# Patient Record
Sex: FEMALE | Race: WHITE | Employment: FULL TIME | URBAN - METROPOLITAN AREA
[De-identification: names, ages, dates, MRNs, and addresses within clinical notes are randomized per-mention and may not be internally consistent; named-entity substitution may affect disease eponyms.]

---

## 2021-12-23 ENCOUNTER — APPOINTMENT (OUTPATIENT)
Dept: GENERAL RADIOLOGY | Age: 26
End: 2021-12-23
Attending: PHYSICIAN ASSISTANT
Payer: COMMERCIAL

## 2021-12-23 ENCOUNTER — HOSPITAL ENCOUNTER (EMERGENCY)
Age: 26
Discharge: HOME | End: 2021-12-23
Attending: EMERGENCY MEDICINE
Payer: COMMERCIAL

## 2021-12-23 VITALS
RESPIRATION RATE: 16 BRPM | WEIGHT: 151.4 LBS | OXYGEN SATURATION: 99 % | BODY MASS INDEX: 25.22 KG/M2 | SYSTOLIC BLOOD PRESSURE: 110 MMHG | DIASTOLIC BLOOD PRESSURE: 71 MMHG | HEIGHT: 65 IN | HEART RATE: 106 BPM | TEMPERATURE: 98.6 F

## 2021-12-23 DIAGNOSIS — U07.1 COVID-19: Primary | ICD-10-CM

## 2021-12-23 DIAGNOSIS — N39.0 URINARY TRACT INFECTION WITHOUT HEMATURIA, SITE UNSPECIFIED: ICD-10-CM

## 2021-12-23 DIAGNOSIS — R51.9 NONINTRACTABLE HEADACHE, UNSPECIFIED CHRONICITY PATTERN, UNSPECIFIED HEADACHE TYPE: ICD-10-CM

## 2021-12-23 DIAGNOSIS — E86.0 DEHYDRATION: ICD-10-CM

## 2021-12-23 DIAGNOSIS — R07.9 CHEST PAIN, UNSPECIFIED TYPE: ICD-10-CM

## 2021-12-23 LAB
ALBUMIN SERPL-MCNC: 3.2 G/DL (ref 3.5–5)
ALBUMIN/GLOB SERPL: 0.9 {RATIO} (ref 1.2–3.5)
ALP SERPL-CCNC: 54 U/L (ref 50–130)
ALT SERPL-CCNC: 13 U/L (ref 12–65)
ANION GAP SERPL CALC-SCNC: 5 MMOL/L (ref 7–16)
AST SERPL-CCNC: 10 U/L (ref 15–37)
BACTERIA URNS QL MICRO: ABNORMAL /HPF
BASOPHILS # BLD: 0 K/UL (ref 0–0.2)
BASOPHILS NFR BLD: 0 % (ref 0–2)
BILIRUB SERPL-MCNC: 0.3 MG/DL (ref 0.2–1.1)
BUN SERPL-MCNC: 14 MG/DL (ref 6–23)
CALCIUM SERPL-MCNC: 9.2 MG/DL (ref 8.3–10.4)
CASTS URNS QL MICRO: 0 /LPF
CHLORIDE SERPL-SCNC: 106 MMOL/L (ref 98–107)
CO2 SERPL-SCNC: 27 MMOL/L (ref 21–32)
COVID-19 RAPID TEST, COVR: DETECTED
CREAT SERPL-MCNC: 0.91 MG/DL (ref 0.6–1)
CRYSTALS URNS QL MICRO: ABNORMAL /LPF
D DIMER PPP FEU-MCNC: <0.27 UG/ML(FEU)
DIFFERENTIAL METHOD BLD: ABNORMAL
EOSINOPHIL # BLD: 0 K/UL (ref 0–0.8)
EOSINOPHIL NFR BLD: 0 % (ref 0.5–7.8)
EPI CELLS #/AREA URNS HPF: ABNORMAL /HPF
ERYTHROCYTE [DISTWIDTH] IN BLOOD BY AUTOMATED COUNT: 12.8 % (ref 11.9–14.6)
GLOBULIN SER CALC-MCNC: 3.4 G/DL (ref 2.3–3.5)
GLUCOSE SERPL-MCNC: 104 MG/DL (ref 65–100)
HCG UR QL: NEGATIVE
HCT VFR BLD AUTO: 43.8 % (ref 35.8–46.3)
HGB BLD-MCNC: 14.5 G/DL (ref 11.7–15.4)
IMM GRANULOCYTES # BLD AUTO: 0.2 K/UL (ref 0–0.5)
IMM GRANULOCYTES NFR BLD AUTO: 2 % (ref 0–5)
LIPASE SERPL-CCNC: 125 U/L (ref 73–393)
LYMPHOCYTES # BLD: 0.8 K/UL (ref 0.5–4.6)
LYMPHOCYTES NFR BLD: 8 % (ref 13–44)
MCH RBC QN AUTO: 30.1 PG (ref 26.1–32.9)
MCHC RBC AUTO-ENTMCNC: 33.1 G/DL (ref 31.4–35)
MCV RBC AUTO: 91.1 FL (ref 79.6–97.8)
MONOCYTES # BLD: 0.5 K/UL (ref 0.1–1.3)
MONOCYTES NFR BLD: 5 % (ref 4–12)
MUCOUS THREADS URNS QL MICRO: 0 /LPF
NEUTS SEG # BLD: 9 K/UL (ref 1.7–8.2)
NEUTS SEG NFR BLD: 85 % (ref 43–78)
NRBC # BLD: 0 K/UL (ref 0–0.2)
PLATELET # BLD AUTO: 209 K/UL (ref 150–450)
PMV BLD AUTO: 9 FL (ref 9.4–12.3)
POTASSIUM SERPL-SCNC: 3.4 MMOL/L (ref 3.5–5.1)
PROT SERPL-MCNC: 6.6 G/DL (ref 6.3–8.2)
RBC # BLD AUTO: 4.81 M/UL (ref 4.05–5.2)
RBC #/AREA URNS HPF: 0 /HPF
SODIUM SERPL-SCNC: 138 MMOL/L (ref 136–145)
SOURCE, COVRS: ABNORMAL
TROPONIN-HIGH SENSITIVITY: 5.8 PG/ML (ref 0–14)
TROPONIN-HIGH SENSITIVITY: 7.1 PG/ML (ref 0–14)
WBC # BLD AUTO: 10.6 K/UL (ref 4.3–11.1)
WBC URNS QL MICRO: ABNORMAL /HPF

## 2021-12-23 PROCEDURE — 74011250637 HC RX REV CODE- 250/637: Performed by: PHYSICIAN ASSISTANT

## 2021-12-23 PROCEDURE — 71045 X-RAY EXAM CHEST 1 VIEW: CPT

## 2021-12-23 PROCEDURE — 99284 EMERGENCY DEPT VISIT MOD MDM: CPT

## 2021-12-23 PROCEDURE — 84484 ASSAY OF TROPONIN QUANT: CPT

## 2021-12-23 PROCEDURE — 81003 URINALYSIS AUTO W/O SCOPE: CPT

## 2021-12-23 PROCEDURE — 81015 MICROSCOPIC EXAM OF URINE: CPT

## 2021-12-23 PROCEDURE — 93005 ELECTROCARDIOGRAM TRACING: CPT | Performed by: PHYSICIAN ASSISTANT

## 2021-12-23 PROCEDURE — 87635 SARS-COV-2 COVID-19 AMP PRB: CPT

## 2021-12-23 PROCEDURE — 80053 COMPREHEN METABOLIC PANEL: CPT

## 2021-12-23 PROCEDURE — 96360 HYDRATION IV INFUSION INIT: CPT

## 2021-12-23 PROCEDURE — 85379 FIBRIN DEGRADATION QUANT: CPT

## 2021-12-23 PROCEDURE — 85025 COMPLETE CBC W/AUTO DIFF WBC: CPT

## 2021-12-23 PROCEDURE — 81025 URINE PREGNANCY TEST: CPT

## 2021-12-23 PROCEDURE — 74011000258 HC RX REV CODE- 258: Performed by: PHYSICIAN ASSISTANT

## 2021-12-23 PROCEDURE — 74011000636 HC RX REV CODE- 636: Performed by: PHYSICIAN ASSISTANT

## 2021-12-23 PROCEDURE — 74011250636 HC RX REV CODE- 250/636: Performed by: PHYSICIAN ASSISTANT

## 2021-12-23 PROCEDURE — 83690 ASSAY OF LIPASE: CPT

## 2021-12-23 PROCEDURE — 74011250636 HC RX REV CODE- 250/636: Performed by: NURSE PRACTITIONER

## 2021-12-23 PROCEDURE — M0243 CASIRIVI AND IMDEVI INFUSION: HCPCS

## 2021-12-23 RX ORDER — IBUPROFEN 800 MG/1
800 TABLET ORAL
Status: COMPLETED | OUTPATIENT
Start: 2021-12-23 | End: 2021-12-23

## 2021-12-23 RX ORDER — CEPHALEXIN 500 MG/1
500 CAPSULE ORAL 2 TIMES DAILY
Qty: 14 CAPSULE | Refills: 0 | Status: SHIPPED | OUTPATIENT
Start: 2021-12-23 | End: 2021-12-30

## 2021-12-23 RX ORDER — SODIUM CHLORIDE 9 MG/ML
1000 INJECTION, SOLUTION INTRAVENOUS ONCE
Status: COMPLETED | OUTPATIENT
Start: 2021-12-23 | End: 2021-12-23

## 2021-12-23 RX ADMIN — IMDEVIMAB: 1332 INJECTION, SOLUTION, CONCENTRATE INTRAVENOUS at 14:12

## 2021-12-23 RX ADMIN — IBUPROFEN 800 MG: 800 TABLET, FILM COATED ORAL at 15:21

## 2021-12-23 RX ADMIN — SODIUM CHLORIDE 1000 ML: 900 INJECTION, SOLUTION INTRAVENOUS at 15:21

## 2021-12-23 RX ADMIN — SODIUM CHLORIDE 1000 ML: 900 INJECTION, SOLUTION INTRAVENOUS at 16:41

## 2021-12-23 NOTE — Clinical Note
[address] [address] [place] EMERGENCY DEPT  [facility] [id number] [phone]    Work/School Note    Date: 12/23/2021     To Whom It May concern:    Theresa Guzman was evaluated by the following provider(s):  Attending Provider: Nakia Olivares DO  Physician Assistant: Kush Guerra virus is suspected. Per the CDC guidelines we recommend home isolation until the following conditions are all met:    1. At least 10 days have passed since symptoms first appeared and  2. At least 24 hours have passed since last fever without the use of fever-reducing medications and  3.  Symptoms (e.g., cough, shortness of breath) have improved      Sincerely,          AALIYAH Wilson

## 2021-12-23 NOTE — ED NOTES
I have reviewed discharge instructions with the patient. The patient verbalized understanding. Patient left ED via Discharge Method: ambulatory to Home with (insert name of self. Opportunity for questions and clarification provided. Patient given 1 scripts. To continue your aftercare when you leave the hospital, you may receive an automated call from our care team to check in on how you are doing. This is a free service and part of our promise to provide the best care and service to meet your aftercare needs.  If you have questions, or wish to unsubscribe from this service please call [phone]. Thank you for Choosing our [facility] Emergency Department.

## 2021-12-23 NOTE — ED PROVIDER NOTES
Patient signed out to me at 1600 shift change. Please see prior providers note for additional the ED course. Briefly, 68-year-old female in the ED with cough, congestion, fever and chills for the last few days. States she had some chest pain this morning, denies at this time. With positive COVID-19 test result. Denies history of CAD, PE or DVT, has a family history of heart disease. Per prior providers note, has history of migraines and reported a headache earlier, this is also resolved. Patient had a largely reassuring work-up with 2 - troponins, reassuring EKG, labs, urinalysis consistent with UTI. She was signed out to me pending return of urine microscope. Urine microscope returned with WBCs present, bacteria. She was febrile and tachycardic initially, mildly hypotensive. States that she typically has an elevated heart rate. Negative D-dimer. Patient received IV fluids, Regeneron, antipyretic in the ED. On my evaluation, patient is denying all complaint, she is seated upright, pleasant conversational, no chest pain or tightness, difficulty breathing, mopped assist, diaphoresis. Discussed all results and plan of care. Patient this is either discharged home following treatment. This is reasonable. She is given very strict return to ED for care instructions. Will provide additional IV fluids and prescription for Keflex. To follow-up with PCP in 1-2 days for reevaluation and to return to ED immediately for new, worsening, concerns. Denies urinary complaint. Low suspicion of deep space infection, RPA/PTA, strep pharyngitis, influenza, encephalitis, meningitis, bacteremia, pneumonia, CA, myocarditis, PE/DVT, ACS, COPD exacerbation, other emergent process. Symptoms likely related to viral URI, with concern given for COVID-19 infection. Advised on therapeutic measures, given very strict return to ED for care instructions, self-quarantine per CDC guidelines.   Symptoms likely related to viral URI, with concern given for COVID-19 infection.

## 2021-12-23 NOTE — ED NOTES
Patient here with concern for chest pain and pain in left arm. Positive covid test yesterday. Reports mild SOB. Pain is what brought her to the ER. Having some nausea as well. Mild distress, O2 sat WNL. Patient evaluated initially in triage. Rapid Medical Evaluation was conducted and necessary orders have been placed. I have performed a medical screening exam.  Care will now be transferred to the provider in the back of the emergency department.   Krystle Lowery, [place] 98:42 PM

## 2021-12-23 NOTE — DISCHARGE INSTRUCTIONS
Drink plenty of fluids, rest, follow-up with your primary care physician for recheck. Check your oxygen saturations multiple times a day and return if they are staying consistently below 90%.

## 2021-12-23 NOTE — ED PROVIDER NOTES
Patient is here with a positive Covid test from home. She states that she drove back from [place] on Monday after visiting family and started with a headache however she has a history of migraines. She has been congested for a couple of days and finally took a Covid test this morning because she was having fever and chills. She had a positive test. She started with chest pain at 10 AM and has no history of blood clots or coronary disease. Her mother had heart disease at 36 and her grandmother at 48. She is also had some left arm pain. There is no swelling to her arm. No swelling/tingling or weakness to her arms or legs, headache, dizziness, dyspnea on exertion, orthopnea, trouble with urination or bowel movements and her last menstrual cycle was 1 week ago and normal. She is here with her significant other and he drove her in. The history is provided by the patient. No past medical history on file. No past surgical history on file. No family history on file. Social History     Socioeconomic History    Marital status:      Spouse name: Not on file    Number of children: Not on file    Years of education: Not on file    Highest education level: Not on file   Occupational History    Not on file   Tobacco Use    Smoking status: Not on file    Smokeless tobacco: Not on file   Substance and Sexual Activity    Alcohol use: Not on file    Drug use: Not on file    Sexual activity: Not on file   Other Topics Concern    Not on file   Social History Narrative    Not on file     Social Determinants of Health     Financial Resource Strain:     Difficulty of Paying Living Expenses: Not on file   Food Insecurity:     Worried About Running Out of Food in the Last Year: Not on file    Ayanna of Food in the Last Year: Not on file   Transportation Needs:     Lack of Transportation (Medical): Not on file    Lack of Transportation (Non-Medical):  Not on file   Physical Activity:     Days of Exercise per Week: Not on file    Minutes of Exercise per Session: Not on file   Stress:     Feeling of Stress : Not on file   Social Connections:     Frequency of Communication with Friends and Family: Not on file    Frequency of Social Gatherings with Friends and Family: Not on file    Attends Yazidism Services: Not on file    Active Member of [address] [place] [address] [employer] or Organizations: Not on file    Attends Club or Organization Meetings: Not on file    Marital Status: Not on file   Intimate Partner Violence:     Fear of Current or Ex-Partner: Not on file    Emotionally Abused: Not on file    Physically Abused: Not on file    Sexually Abused: Not on file   Housing Stability:     Unable to Pay for Housing in the Last Year: Not on file    Number of [place] in the Last Year: Not on file    Unstable Housing in the Last Year: Not on file         ALLERGIES: Patient has no known allergies. Review of Systems   Constitutional: Positive for chills. HENT: Negative. Eyes: Negative. Respiratory: Positive for shortness of breath. Cardiovascular: Positive for chest pain. Negative for leg swelling. Gastrointestinal: Positive for nausea. Genitourinary: Negative. Musculoskeletal: Negative. Skin: Negative. Neurological: Negative. Psychiatric/Behavioral: Negative. All other systems reviewed and are negative. Vitals:    12/23/21 1159 12/23/21 1244   BP: 93/64    Pulse: 82    Resp: 16    Temp: 98.4 °F (36.9 °C)    SpO2: 99%    Weight: 68 kg (150 lb) 68.7 kg (151 lb 6.4 oz)   Height: 5' 6\" (1.676 m)             Physical Exam  Vitals and nursing note reviewed. Constitutional:       Appearance: She is well-developed. HENT:      Head: Normocephalic and atraumatic.       Right Ear: Tympanic membrane, ear canal and external ear normal.      Left Ear: Tympanic membrane, ear canal and external ear normal.      Nose: Nose normal.      Mouth/Throat:      Mouth: Mucous membranes are moist.   Eyes: Extraocular Movements: Extraocular movements intact. Conjunctiva/sclera: Conjunctivae normal.      Pupils: Pupils are equal, round, and reactive to light. Cardiovascular:      Rate and Rhythm: Normal rate and regular rhythm. Pulses: Normal pulses. Heart sounds: Normal heart sounds. Pulmonary:      Effort: Pulmonary effort is normal.      Breath sounds: Normal breath sounds. Comments: Tenderness palpation over the anterior chest wall, palpation of this area reproduces her pain. Chest:      Chest wall: Tenderness present. Abdominal:      General: Abdomen is flat. Bowel sounds are normal.      Palpations: Abdomen is soft. Musculoskeletal:         General: Normal range of motion. Cervical back: Normal range of motion and neck supple. Skin:     General: Skin is warm and dry. Capillary Refill: Capillary refill takes less than 2 seconds. Neurological:      General: No focal deficit present. Mental Status: She is alert and oriented to person, place, and time. Mental status is at baseline. Cranial Nerves: No cranial nerve deficit. Sensory: No sensory deficit. Motor: No weakness. Coordination: Coordination normal.      Gait: Gait normal.      Deep Tendon Reflexes: Reflexes are normal and symmetric. Reflexes normal.   Psychiatric:         Mood and Affect: Mood normal.         Behavior: Behavior normal.         Thought Content: Thought content normal.         Judgment: Judgment normal.          MDM  Number of Diagnoses or Management Options     Amount and/or Complexity of Data Reviewed  Clinical lab tests: ordered  Tests in the radiology section of CPT®: ordered    Risk of Complications, Morbidity, and/or Mortality  Presenting problems: moderate  Diagnostic procedures: moderate  Management options: moderate           Procedures      3:12 PM patient with a headache however she has a history of migraines.   She had taken her migraine medicine earlier without relief. I will give her some ibuprofen now and some IV fluids to see if that will help. Her  is driving her home. Second troponin was due at 2:30pm.  Awaiting that. Patient is tachycardic but is dehydrated. I had added a liter of normal saline to help with that. I believe the tachycardia is coming from that. Her D-dimer is less than 0.27. Patient was discussed with Dr. Ellne Hays, attending physician. We discussed the history, physical exam and work-up. Patient with a positive Covid test requesting antibody treatment here. Patient was given the Regeneron monoclonal antibody treatment and did well. Patient was monitored for an hour after the treatment and did fine. Patient was encouraged to check their oxygen saturations multiple times a day at home and return to the ED if it is staying consistently below 90%. Patient does not have a pulse oximeter at home that they can use, so I will send her with one. Drink plenty of fluids, rest, use over-the-counter ibuprofen and or Tylenol instructed and return to the ED if worsening in any way. Patient is stable for discharge and ambulatory out of the ED without difficulty at this time. 4:01 PM Care transferred to [facility] NP at shift change. He will review urine microxcopic and 2 hr troponin.

## 2021-12-23 NOTE — ED TRIAGE NOTES
Pt states she has had a cough since yesterday and a positive home covid test this morning. States some nausea since this morning and SOB with chest pain into left arm. Masked for triage.

## 2021-12-24 LAB
ATRIAL RATE: 85 BPM
CALCULATED P AXIS, ECG09: 75 DEGREES
CALCULATED R AXIS, ECG10: 82 DEGREES
CALCULATED T AXIS, ECG11: 76 DEGREES
DIAGNOSIS, 93000: NORMAL
P-R INTERVAL, ECG05: 148 MS
Q-T INTERVAL, ECG07: 318 MS
QRS DURATION, ECG06: 72 MS
QTC CALCULATION (BEZET), ECG08: 378 MS
VENTRICULAR RATE, ECG03: 85 BPM

## 2022-04-01 ENCOUNTER — OFFICE VISIT (OUTPATIENT)
Dept: FAMILY MEDICINE CLINIC | Age: 27
End: 2022-04-01
Payer: COMMERCIAL

## 2022-04-01 VITALS
RESPIRATION RATE: 17 BRPM | HEART RATE: 79 BPM | HEIGHT: 65 IN | SYSTOLIC BLOOD PRESSURE: 103 MMHG | BODY MASS INDEX: 25.33 KG/M2 | DIASTOLIC BLOOD PRESSURE: 66 MMHG | TEMPERATURE: 97.1 F | OXYGEN SATURATION: 99 % | WEIGHT: 152 LBS

## 2022-04-01 DIAGNOSIS — G43.019 INTRACTABLE MIGRAINE WITHOUT AURA AND WITHOUT STATUS MIGRAINOSUS: Primary | ICD-10-CM

## 2022-04-01 DIAGNOSIS — Z00.00 ENCOUNTER FOR MEDICAL EXAMINATION TO ESTABLISH CARE: ICD-10-CM

## 2022-04-01 DIAGNOSIS — Z87.42 HISTORY OF HEAVY PERIODS: ICD-10-CM

## 2022-04-01 DIAGNOSIS — G93.9 BRAIN LESION: ICD-10-CM

## 2022-04-01 DIAGNOSIS — N80.9 ENDOMETRIOSIS: ICD-10-CM

## 2022-04-01 PROCEDURE — 99204 OFFICE O/P NEW MOD 45 MIN: CPT | Performed by: FAMILY MEDICINE

## 2022-04-01 RX ORDER — ACETAMINOPHEN AND CODEINE PHOSPHATE 120; 12 MG/5ML; MG/5ML
1 SOLUTION ORAL DAILY
Qty: 3 DOSE PACK | Refills: 1 | Status: SHIPPED | OUTPATIENT
Start: 2022-04-01 | End: 2022-10-19

## 2022-04-01 RX ORDER — DULOXETIN HYDROCHLORIDE 30 MG/1
30 CAPSULE, DELAYED RELEASE ORAL DAILY
COMMUNITY
End: 2022-05-12 | Stop reason: ALTCHOICE

## 2022-04-01 RX ORDER — NORGESTIMATE AND ETHINYL ESTRADIOL 0.25-0.035
1 KIT ORAL DAILY
COMMUNITY
End: 2022-04-01

## 2022-04-01 RX ORDER — PROPRANOLOL HYDROCHLORIDE 20 MG/1
40 TABLET ORAL DAILY
Qty: 30 TABLET | Refills: 1 | Status: SHIPPED | OUTPATIENT
Start: 2022-04-01 | End: 2022-04-13 | Stop reason: SDUPTHER

## 2022-04-01 RX ORDER — RIZATRIPTAN BENZOATE 10 MG/1
10 TABLET ORAL
COMMUNITY
End: 2022-08-16

## 2022-04-01 NOTE — PROGRESS NOTES
Chief Complaint   Patient presents with   [address]    Neck Pain    Referral Request     Neurology     1. Have you been to the ER, urgent care clinic since your last visit? Hospitalized since your last visit? N/A    2. Have you seen or consulted any other health care providers outside of the [address] [place] [address] since your last visit? Include any pap smears or colon screening.  N/A

## 2022-04-01 NOTE — PROGRESS NOTES
Mc Nickerson  27 y.o. female  1995  [id number]  [facility]  Progress Note     Assessment and Plan:    1. Intractable migraine without aura and without status migrainosus  Will try BB as PPX, given other agents failed. Referral to neuro provided. No change to other meds at this time. Records requested. - REFERRAL TO NEUROLOGY  - propranoloL (INDERAL) 20 mg tablet; Take 2 Tablets by mouth daily. Dispense: 30 Tablet; Refill: 1    2. Brain lesion  Per patient had a lesion ? cyst on MRI in [place]. Records requested. Per patient she was followed by neuro for this and wanted to establish care with neuro here as well. - REFERRAL TO NEUROLOGY    3. History of heavy periods  Can not be on COCP. This was cancelled and progestin only option provided. Patient desires IUD and paperwork was provided. - norethindrone (MICRONOR) 0.35 mg tab; Take 1 Tablet by mouth daily. Dispense: 3 Dose Pack; Refill: 1    4. Endometriosis  Never has laparoscopic surgery, but presumably was treated for this by OBGYN. Getting records. 5. Establish care  Diet, exercise and safety discussed with patient. Diagnoses and plans were discussed with the patient. After visit summary given to the patient as well. Agustín Pacheco MD    Mc Nickerson is a 27 y.o. female who had concerns including Establish Care, Neck Pain, and Referral Request (Neurology). Health maintenance:    Used to see IM doctor Thalia Kline in [place], [place].    Her only chronic issue was neck pain, that made her migraines worse   Seen PT 2 times, which helped as well as TP injections  She used to follow with neurology Dr Macario Pablo, who also treated with injections what sounds like occipital nerve block - helped just for 3 months, but much better than regular TP  She needs referral to neurology     Migraines:   Rizotriptan -given rebound HA sometimes  Elmore Heal - helps little less, but does not give rebound HA  Once a month she has HA that \"knocks me down\" and she is \"out for 24 hours\", then it stops  Tried topiramite, amytriptiline and it did not help   Cymbalta helps with anxiety and a little with stress HA, but does little to none to prevent migraines   Wants to try another PPX   She is on OCP     Birth control:   Used to be on Depo  Was told she may need to change OCP to something else d/t migraines, but did not  Has hx of very heavy menses and endometriosis     The following sections were reviewed & updated as appropriate: PMH, PSH, FH, and SH. There is no problem list on file for this patient. Prior to Admission medications    Medication Sig Start Date End Date Taking? Authorizing Provider   DULoxetine (Cymbalta) 30 mg capsule Take 30 mg by mouth daily. Yes Provider, Historical   rizatriptan (MAXALT) 10 mg tablet Take 10 mg by mouth once as needed for Migraine. May repeat in 2 hours if needed   Yes Provider, Historical   ubrogepant Madonna Feeler) 100 mg tablet Take 100 mg by mouth once as needed for Migraine. Yes Provider, Historical   propranoloL (INDERAL) 20 mg tablet Take 2 Tablets by mouth daily. 4/1/22  Yes Kendell Nielsen MD   norethindrone [facility]) 0.35 mg tab Take 1 Tablet by mouth daily.  4/1/22  Yes Kendell Nielsen MD          No Known Allergies      Smoker:   Social History     Tobacco Use   Smoking Status Never Smoker   Smokeless Tobacco Never Used     ETOH:   Social History     Substance and Sexual Activity   Alcohol Use Yes       FH:    Family History   Problem Relation Age of Onset    Asthma Mother     Supraventricular tachycardia Mother     Thyroid Disease Father     Diabetes Father     Migraines Father     Migraines Brother     Asthma Brother     Depression Brother     ADD / ADHD Brother     Heart Attack Maternal Grandmother     Other Maternal Grandmother         Diverticulitis    Asthma Maternal Grandmother     Cancer Maternal Grandfather         lung (smoker)    Alcohol abuse Maternal Grandfather     Liver Disease Maternal Grandfather     Stroke Paternal Grandmother     Seizures Paternal Grandmother     Heart Failure Paternal Grandfather     Abdominal aortic aneurysm Paternal Grandfather        Review of Systems   Constitutional: Negative for malaise/fatigue and weight loss. HENT: Negative for congestion. Respiratory: Negative for cough. Cardiovascular: Negative for palpitations and leg swelling. Gastrointestinal: Negative for abdominal pain, nausea and vomiting. Musculoskeletal: Negative for myalgias. Neurological: Positive for headaches. Psychiatric/Behavioral: The patient does not have insomnia. Physical Exam:  Visit Vitals  /66   Pulse 79   Temp 97.1 °F (36.2 °C) (Temporal)   Resp 17   Ht 5' 5\" (1.651 m)   Wt 152 lb (68.9 kg)   LMP 03/04/2022   SpO2 99%   BMI 25.29 kg/m²       Physical Examination:  Physical Exam  General appearance - alert, well appearing, and in no distress, oriented to person, place, and time and normal appearing weight  Mental status -  normal mood, behavior, speech, dress, motor activity, and thought processes, no expressed suicidal or homicidal ideation, no hallucinations  Neck - supple, no significant adenopathy  Chest - normal chest excursion, normal inspiratory and expiratory function. Clear to ausculation bilaterally. Heart - normal rate, regular rhythm, normal S1, S2, no murmurs, rubs, clicks or gallops, no extremity edema  Abdomen- benign, no organomegaly or masses  Skin-no rashes or suspicious moles  Neuro normal speech, moves all extremities, normal gait  Musculoskeletal- grossly normal joint and motor function. d to person, place, and time and normal appearing weight

## 2022-04-01 NOTE — PATIENT INSTRUCTIONS
Migraine Headache: Care Instructions  Overview     Migraines are painful, throbbing headaches that often start on one side of the head. They may cause nausea and vomiting and make you sensitive to light, sound, or smell. Without treatment, migraines can last from 4 hours to a few days. Medicines can help prevent migraines or stop them after they have started. Your doctor can help you find which ones work best for you. Follow-up care is a key part of your treatment and safety. Be sure to make and go to all appointments, and call your doctor if you are having problems. It's also a good idea to know your test results and keep a list of the medicines you take. How can you care for yourself at home? · Do not drive if you have taken a prescription pain medicine. · Rest in a quiet, dark room until your headache is gone. Close your eyes, and try to relax or go to sleep. Don't watch TV or read. · Put a cold, moist cloth or cold pack on the painful area for 10 to 20 minutes at a time. Put a thin cloth between the cold pack and your skin. · Use a warm, moist towel or a heating pad set on low to relax tight shoulder and neck muscles. · Have someone gently massage your neck and shoulders. · Take your medicines exactly as prescribed. Call your doctor if you think you are having a problem with your medicine. You will get more details on the specific medicines your doctor prescribes. · Don't take medicine for headache pain too often. Talk to your doctor if you are taking medicine more than 2 days a week to stop a headache. Taking too much pain medicine can lead to more headaches. These are called medicine-overuse headaches. To prevent migraines  · Keep a headache diary so you can figure out what triggers your headaches. Avoiding triggers may help you prevent headaches. Record when each headache began, how long it lasted, and what the pain was like.  Write down any other symptoms you had with the headache, such as nausea, flashing lights or dark spots, or sensitivity to bright light or loud noise. Note if the headache occurred near your period. List anything that might have triggered the headache. Triggers may include certain foods (chocolate, cheese, wine) or odors, smoke, bright light, stress, or lack of sleep. · If your doctor has prescribed medicine for your migraines, take it as directed. You may have medicine that you take only when you get a migraine and medicine that you take all the time to help prevent migraines. ? If your doctor has prescribed medicine for when you get a headache, take it at the first sign of a migraine, unless your doctor has given you other instructions. ? If your doctor has prescribed medicine to prevent migraines, take it exactly as prescribed. Call your doctor if you think you are having a problem with your medicine. · Find healthy ways to deal with stress. Migraines are most common during or right after stressful times. Try finding ways to reduce stress like practicing mindfulness or deep breathing exercises. · Get plenty of sleep and exercise. But be careful to not push yourself too hard during exercise. It may trigger a headache. · Eat meals on a regular schedule. Avoid foods and drinks that often trigger migraines. These include chocolate, alcohol (especially red wine and port), aspartame, monosodium glutamate (MSG), and some additives found in foods (such as hot dogs, lopez, cold cuts, aged cheeses, and pickled foods). · Limit caffeine. Don't drink too much coffee, tea, or soda. But don't quit caffeine suddenly. That can also give you migraines. · Do not smoke or allow others to smoke around you. If you need help quitting, talk to your doctor about stop-smoking programs and medicines. These can increase your chances of quitting for good. · If you are taking birth control pills or hormone therapy, talk to your doctor about whether they are triggering your migraines.   When should you call for help? Call 911 anytime you think you may need emergency care. For example, call if:    · You have signs of a stroke. These may include:  ? Sudden numbness, paralysis, or weakness in your face, arm, or leg, especially on only one side of your body. ? Sudden vision changes. ? Sudden trouble speaking. ? Sudden confusion or trouble understanding simple statements. ? Sudden problems with walking or balance. ? A sudden, severe headache that is different from past headaches. Call your doctor now or seek immediate medical care if:    · You have new or worse nausea and vomiting.     · You have a new or higher fever.     · Your headache gets much worse. Watch closely for changes in your health, and be sure to contact your doctor if:    · You are not getting better after 2 days (48 hours). Where can you learn more? Go to http://www.gray.com/  Enter Y944 in the search box to learn more about \"Migraine Headache: Care Instructions. \"  Current as of: December 13, 2021               Content Version: 13.2  © 2006-2022 [employer]. Care instructions adapted under license by [employer] (which disclaims liability or warranty for this information). If you have questions about a medical condition or this instruction, always ask your healthcare professional. [address] any warranty or liability for your use of this information.

## 2022-04-10 DIAGNOSIS — G43.019 INTRACTABLE MIGRAINE WITHOUT AURA AND WITHOUT STATUS MIGRAINOSUS: ICD-10-CM

## 2022-04-13 RX ORDER — PROPRANOLOL HYDROCHLORIDE 20 MG/1
40 TABLET ORAL DAILY
Qty: 30 TABLET | Refills: 1 | Status: SHIPPED | OUTPATIENT
Start: 2022-04-13 | End: 2022-05-12 | Stop reason: SDUPTHER

## 2022-04-18 ENCOUNTER — OFFICE VISIT (OUTPATIENT)
Dept: FAMILY MEDICINE CLINIC | Age: 27
End: 2022-04-18
Payer: COMMERCIAL

## 2022-04-18 VITALS
TEMPERATURE: 97.1 F | SYSTOLIC BLOOD PRESSURE: 89 MMHG | HEART RATE: 79 BPM | HEIGHT: 65 IN | WEIGHT: 150 LBS | BODY MASS INDEX: 24.99 KG/M2 | RESPIRATION RATE: 18 BRPM | DIASTOLIC BLOOD PRESSURE: 64 MMHG | OXYGEN SATURATION: 99 %

## 2022-04-18 DIAGNOSIS — M62.838 TRAPEZIUS MUSCLE SPASM: Primary | ICD-10-CM

## 2022-04-18 DIAGNOSIS — M54.2 NECK PAIN: ICD-10-CM

## 2022-04-18 PROCEDURE — 99213 OFFICE O/P EST LOW 20 MIN: CPT | Performed by: FAMILY MEDICINE

## 2022-04-18 PROCEDURE — 20552 NJX 1/MLT TRIGGER POINT 1/2: CPT | Performed by: FAMILY MEDICINE

## 2022-04-18 RX ORDER — DEXAMETHASONE SODIUM PHOSPHATE 4 MG/ML
2 INJECTION, SOLUTION INTRA-ARTICULAR; INTRALESIONAL; INTRAMUSCULAR; INTRAVENOUS; SOFT TISSUE ONCE
Status: COMPLETED | OUTPATIENT
Start: 2022-04-18 | End: 2022-04-18

## 2022-04-18 RX ORDER — LIDOCAINE HYDROCHLORIDE 10 MG/ML
2 INJECTION INFILTRATION; PERINEURAL ONCE
Status: COMPLETED | OUTPATIENT
Start: 2022-04-18 | End: 2022-04-18

## 2022-04-18 RX ADMIN — DEXAMETHASONE SODIUM PHOSPHATE 2 MG: 4 INJECTION, SOLUTION INTRA-ARTICULAR; INTRALESIONAL; INTRAMUSCULAR; INTRAVENOUS; SOFT TISSUE at 17:09

## 2022-04-18 RX ADMIN — LIDOCAINE HYDROCHLORIDE 2 ML: 10 INJECTION INFILTRATION; PERINEURAL at 17:10

## 2022-04-18 NOTE — PROGRESS NOTES
[id number] [facility]      Chief Complaint:   Chief Complaint   Patient presents with    Neck Pain       Subjective:   History: This patient is a 27 y.o. female with a chief complaint of neck pain. Has been ongoing for 4 years, getting more painful and more regular. No injury or trauma. She saw a chiropractor, which did not help. Pain is on the right side at base of skull, also extends to R shoulder. Has started having pain in L shoulder as well. She has seen PT and does exercises at home, which initially helped. She also received a lidocaine injection, with no relief, and another injection from a neurologist, which helped for 6-8 weeks (unusre what was in injection). She reports occasional spasms, where she has difficulty with movement, which improved with steroid. Worse with prolonged sitting at desk and looking at computer. Gets migraines, which follows a similar pattern to her neck pain. She also reports L shoulder pain, located laterally. Worse with lifting and raising. Does not bother her at rest. It is a sharp pain when it occurs. No bowel or bladder incontinence. No fever, night sweats, or weight changes. No saddle anesthesia.     Past Medical History:   Diagnosis Date    Migraines      Family History   Problem Relation Age of Onset    Asthma Mother     Supraventricular tachycardia Mother     Thyroid Disease Father     Diabetes Father     Migraines Father     Migraines Brother     Asthma Brother     Depression Brother     ADD / ADHD Brother     Heart Attack Maternal Grandmother     Other Maternal Grandmother         Diverticulitis    Asthma Maternal Grandmother     Cancer Maternal Grandfather         lung (smoker)    Alcohol abuse Maternal Grandfather     Liver Disease Maternal Grandfather     Stroke Paternal Grandmother     Seizures Paternal Grandmother     Heart Failure Paternal Grandfather     Abdominal aortic aneurysm Paternal Grandfather      Current Outpatient Medications   Medication Sig Dispense Refill    propranoloL (INDERAL) 20 mg tablet TAKE 2 TABLETS BY MOUTH DAILY 30 Tablet 1    DULoxetine (Cymbalta) 30 mg capsule Take 30 mg by mouth daily.  rizatriptan (MAXALT) 10 mg tablet Take 10 mg by mouth once as needed for Migraine. May repeat in 2 hours if needed      ubrogepant Madonna Feeler) 100 mg tablet Take 100 mg by mouth once as needed for Migraine.  norethindrone (MICRONOR) 0.35 mg tab Take 1 Tablet by mouth daily. 3 Dose Pack 1     No Known Allergies  Social History     Socioeconomic History    Marital status:      Spouse name: Not on file    Number of children: Not on file    Years of education: Not on file    Highest education level: Not on file   Occupational History    Not on file   Tobacco Use    Smoking status: Never Smoker    Smokeless tobacco: Never Used   Substance and Sexual Activity    Alcohol use: Yes    Drug use: Never    Sexual activity: Not on file   Other Topics Concern    Not on file   Social History Narrative    Not on file     Social Determinants of Health     Financial Resource Strain:     Difficulty of Paying Living Expenses: Not on file   Food Insecurity:     Worried About Running Out of Food in the Last Year: Not on file    Ayanna of Food in the Last Year: Not on file   Transportation Needs:     Lack of Transportation (Medical): Not on file    Lack of Transportation (Non-Medical):  Not on file   Physical Activity: Insufficiently Active    Days of Exercise per Week: 3 days    Minutes of Exercise per Session: 20 min   Stress:     Feeling of Stress : Not on file   Social Connections:     Frequency of Communication with Friends and Family: Not on file    Frequency of Social Gatherings with Friends and Family: Not on file    Attends [employer] Services: Not on file    Active Member of Clubs or Organizations: Not on file    Attends Club or Organization Meetings: Not on file    Marital Status: Not on file   Intimate Partner Violence: Not At Risk    Fear of Current or Ex-Partner: No    Emotionally Abused: No    Physically Abused: No    Sexually Abused: No   Housing Stability:     Unable to Pay for Housing in the Last Year: Not on file    Number of Places Lived in the Last Year: Not on file    Unstable Housing in the Last Year: Not on file       Review of Systems  Pertinent items are noted in HPI. Objective:     Visit Vitals  BP (!) 89/64 (BP 1 Location: Left upper arm, BP Patient Position: Sitting, BP Cuff Size: Adult)   Pulse 79   Temp 97.1 °F (36.2 °C) (Temporal)   Resp 18   Ht 5' 5\" (1.651 m)   Wt 150 lb (68 kg)   SpO2 99%   BMI 24.96 kg/m²     General: Alert and oriented and in no acute distress. Responds to all questions appropriately. NEUROLOGIC: Speech intact; face symmetrical; moves all extremities equally. MSK:    Posture: Normal   Deformity: None    ROM - Cervical spine:     Flexion: Normal     Extension: Normal     Lateral bending: Normal    Upper Ext: FROM bilaterally       Palpation:    C1 - T1 tenderness: None    Trapezious tenderness: R trapezius with 1 trigger point identified      Strength (0-5/5):    :    Left: 5/5  Right: 5/5    Wrist Extension:  Left: 5/5  Right: 5/5    Wrist Flexion:  Left: 5/5  Right: 5/5    Elbow Flexion:  Left: 5/5  Right: 5/5    Elbow Extension:  Left: 5/5  Right: 5/5    Shoulder Flexion:  Left: 5/5  Right: 5/5    Shoulder Abduction:   Left: 5/5  Right: 5/5    Shouder Ext Rotation: Left: 5/5  Right: 5/5    Shoulder Int Rotation:  Left: 5/5  Right: 5/5       Sensation: Intact, no deficits in the upper ext bilaterally. Special test:    Hawkin's: Left: Positive  Right: Negative      EXTREMITIES: Well perfused. Moving all extremities equally. PROCEDURE NOTE:     Informed consent - Risks, benefits and alternatives discussed.   Time Out - Performed, cross checking patient ID and procedure. Preparation - Cleaned and prepped with alcohol swab x3. Anesthesia - Ethyl chloride spray used to anesthitise the skin prior to injection. Description of procedure - 1 trigger point identified and each injected with 0.5 ml of dexamethasone and 2 ml of 1% lidocaine mixture under sterile conditions. Patient tolerated the procedure well and there were no complications. Patient reports pain relief following the injections. Assessment:     Neck pain: Likely due to trap spasm. Discussed treatment options and she would like to proceed with trigger point injection. Plan:   1. Home Exercise Program as per handout. 2. Ice 15 minutes, three times a day for 48 hours. 3. Trigger point injection as above. Medications:    1. Ibuprofen (Motrin, Advil):  200mg - take 1-4 tablets three times a day for 5 days. -OR-    Naproxin (Aleve): 220mg 1-2 tablets twice a day for 5 days   2. Acetaminophen (Tylenol):  500mg 1-2 tablets every 6 hours as needed for pain.     RTC: PRN

## 2022-04-18 NOTE — PROGRESS NOTES
Pain Assessment Encounter      Mariaa Victoria  Chief Complaint   Patient presents with    Neck Pain     Visit Vitals  BP (!) 89/64 (BP 1 Location: Left upper arm, BP Patient Position: Sitting, BP Cuff Size: Adult)   Pulse 79   Temp 97.1 °F (36.2 °C) (Temporal)   Resp 18   Ht 5' 5\" (1.651 m)   Wt 150 lb (68 kg)   SpO2 99%   BMI 24.96 kg/m²       Onset of Symptoms: 2018, no injury  Description:pain along the back of her neck. Consistent aching but sometimes has sharp pain at the base of her neck. Also has pain in her right trap muscle as well as b/l shoulder pain. Has DDD, shown on MRIs and x-rays. Has done PT    Pain Scale:(1-10): 2  Trauma Hx: no injury  Hx of similar symptoms: Yes  Radiation: radiates to her shoulders    Progression: has worsened  What makes it better?: heat, rest  What makes it worse?:sitting and lifting  Medications tried: ibuprofen    1. \"Have you been to the ER, urgent care clinic since your last visit? Hospitalized since your last visit? \" No    2. \"Have you seen or consulted any other health care providers outside of the [address] [place] [address] since your last visit? \" No     3. For patients aged 39-70: Has the patient had a colonoscopy / FIT/ Cologuard? NA - based on age      If the patient is female:    4. For patients aged 41-77: Has the patient had a mammogram within the past 2 years? NA - based on age or sex      11. For patients aged 21-65: Has the patient had a pap smear?  No

## 2022-05-03 DIAGNOSIS — Z87.42 HISTORY OF HEAVY PERIODS: ICD-10-CM

## 2022-05-04 ENCOUNTER — TELEPHONE (OUTPATIENT)
Dept: FAMILY MEDICINE CLINIC | Age: 27
End: 2022-05-04

## 2022-05-04 RX ORDER — ACETAMINOPHEN AND CODEINE PHOSPHATE 120; 12 MG/5ML; MG/5ML
1 SOLUTION ORAL DAILY
Qty: 3 DOSE PACK | Refills: 1 | OUTPATIENT
Start: 2022-05-04

## 2022-05-04 NOTE — TELEPHONE ENCOUNTER
Called patient and informed her that both her medication are at [facility] and she should reach out to them for .

## 2022-05-12 ENCOUNTER — OFFICE VISIT (OUTPATIENT)
Dept: FAMILY MEDICINE CLINIC | Age: 27
End: 2022-05-12
Payer: COMMERCIAL

## 2022-05-12 VITALS
TEMPERATURE: 97 F | HEART RATE: 100 BPM | SYSTOLIC BLOOD PRESSURE: 124 MMHG | OXYGEN SATURATION: 98 % | RESPIRATION RATE: 16 BRPM | BODY MASS INDEX: 25.29 KG/M2 | DIASTOLIC BLOOD PRESSURE: 88 MMHG | WEIGHT: 152 LBS

## 2022-05-12 DIAGNOSIS — F41.1 GENERALIZED ANXIETY DISORDER: ICD-10-CM

## 2022-05-12 DIAGNOSIS — Z83.49 FAMILY HISTORY OF THYROID DISEASE: ICD-10-CM

## 2022-05-12 DIAGNOSIS — G43.009 MIGRAINE WITHOUT AURA AND WITHOUT STATUS MIGRAINOSUS, NOT INTRACTABLE: Primary | ICD-10-CM

## 2022-05-12 DIAGNOSIS — G43.019 INTRACTABLE MIGRAINE WITHOUT AURA AND WITHOUT STATUS MIGRAINOSUS: ICD-10-CM

## 2022-05-12 PROBLEM — M54.2 NECK PAIN: Status: ACTIVE | Noted: 2020-10-05

## 2022-05-12 PROCEDURE — 99214 OFFICE O/P EST MOD 30 MIN: CPT | Performed by: STUDENT IN AN ORGANIZED HEALTH CARE EDUCATION/TRAINING PROGRAM

## 2022-05-12 RX ORDER — VENLAFAXINE HYDROCHLORIDE 75 MG/1
75 CAPSULE, EXTENDED RELEASE ORAL DAILY
Qty: 90 CAPSULE | Refills: 1 | Status: SHIPPED | OUTPATIENT
Start: 2022-05-12 | End: 2022-10-31 | Stop reason: SDUPTHER

## 2022-05-12 RX ORDER — PROPRANOLOL HYDROCHLORIDE 40 MG/1
40 TABLET ORAL DAILY
Qty: 90 TABLET | Refills: 1 | Status: SHIPPED | OUTPATIENT
Start: 2022-05-12 | End: 2022-08-24

## 2022-05-12 RX ORDER — VENLAFAXINE HYDROCHLORIDE 37.5 MG/1
37.5 CAPSULE, EXTENDED RELEASE ORAL DAILY
Qty: 7 CAPSULE | Refills: 14 | Status: SHIPPED | OUTPATIENT
Start: 2022-05-12 | End: 2022-06-29 | Stop reason: DRUGHIGH

## 2022-05-12 NOTE — PATIENT INSTRUCTIONS
Deciding About Taking Medicine to Prevent Migraines  How can you decide about taking medicine to prevent migraine headaches? What are migraines? Migraines are painful, throbbing headaches. They can last from 4 to 72 hours. They often occur on only one side of your head. But you may feel them on both sides. The pain may keep you from doing your daily activities. You may take a daily medicine if you get bad migraines often. This can help prevent them. What are key points about this decision? · Medicines to prevent migraines may not stop them every time. But if you take them daily, you can reduce how many migraines you get by more than half. They can also reduce how long migraines last. And your symptoms may not be as bad. · Medicines that prevent migraines may cause side effects. You may have sleep and memory problems, upset stomach, dry mouth, or constipation. Some of these side effects may last for as long as you take the medicine. Or they may go away within a few weeks. Why might you choose to take medicine to prevent migraines? · You are willing to take medicine daily if it will help your symptoms. · You don't think the side effects of the medicine could be as bad as your migraine symptoms. · Your migraines get in the way of your work. Or they harm your relationships with friends and family. · Benefits of medicine include fewer or no migraines. And your migraines may not last as long or feel as bad. Why might you choose not to take medicine to prevent migraines? · You want to avoid the side effects of the medicine. · You don't want to take medicine every day. · Your migraines are not affecting your work and relationships. · If your symptoms don't improve with home treatment and other medicines, you can decide later to take medicine every day to help prevent migraines. Your decision  Thinking about the facts and your feelings can help you make a decision that is right for you.  Be sure you understand the benefits and risks of your options, and think about what else you need to do before you make the decision. Where can you learn more? Go to http://www.gray.com/  Enter V224 in the search box to learn more about \"Deciding About Taking Medicine to Prevent Migraines. \"  Current as of: December 13, 2021               Content Version: 13.2  © 3864-7489 [employer]. Care instructions adapted under license by [employer] (which disclaims liability or warranty for this information). If you have questions about a medical condition or this instruction, always ask your healthcare professional. [place] [address] any warranty or liability for your use of this information.

## 2022-05-12 NOTE — PROGRESS NOTES
Chief Complaint   Patient presents with    Headache     1. Have you been to the ER, urgent care clinic since your last visit? Hospitalized since your last visit? No    2. Have you seen or consulted any other health care providers outside of the [address] [place] [address] since your last visit? Include any pap smears or colon screening.  No

## 2022-05-12 NOTE — PROGRESS NOTES
Nikos Mcginnis is an 27 y.o. female who presents for migraines    Patient presents for the evaluation of headache. The patient reports having a severe headache that interferes with her functioning 2 days of week, and reports having any headache 5 days per week. The pattern of the headaches has changed - increased. The patient denies fever, weight loss, cough, or history of malignancy. The patient reports visual neurologic symptoms associated with the headache. The patient denies a \"thunderclap\" onset of the headache. The patient denies associated neck stiffness. Patient denies onset of pain during exercise. Denies loss of consciousness. The onset of these headaches was age 17 (> 56yo is potential indication for imaging). The patient reports the headache pattern is more frequent. Headache episodes last 24-48 hours. (4-72 for migraine, only 3-4 for cluster)   The patient describes the headache as severe, unilateral throbbing, worsening with activity, associated with nausea, and sensitivity to light    The patient reports taking ubrelvy most days, maxalt for severe days for the headache which does help. Reviewed possible migraine triggers, and patient reports having sleep deprivation, significant stress (ran out of cymbalta for anxiety). Good headache hygiene, aware of triggers and avoids rebound medications if possible. Is taking Daniel Kells almost daily however due to headache pattern       #migraines with aura  - Was on cymbalta for anxiety, ran out  - Taking propranolol 20mg daily   - Put in 2 weeks at her job yesterday    #wakes up starving  - took several pregnancy tests, negative  - history of thyroid issues in her family  - both over and underactive    Allergies - reviewed:   No Known Allergies      Medications - reviewed:   Current Outpatient Medications   Medication Sig    venlafaxine-SR (EFFEXOR-XR) 37.5 mg capsule Take 1 Capsule by mouth daily.     venlafaxine-SR [facility]...) 75 mg capsule Take 1 Capsule by mouth daily.  propranoloL (INDERAL) 40 mg tablet Take 1 Tablet by mouth daily.  rizatriptan (MAXALT) 10 mg tablet Take 10 mg by mouth once as needed for Migraine. May repeat in 2 hours if needed    ubrogepant Bonnee Rain) 100 mg tablet Take 100 mg by mouth once as needed for Migraine.  norethindrone (MICRONOR) 0.35 mg tab Take 1 Tablet by mouth daily. No current facility-administered medications for this visit. Past Medical History - reviewed:  Past Medical History:   Diagnosis Date    Migraines          Past Surgical History - reviewed:   Past Surgical History:   Procedure Laterality Date    HX SEPTOPLASTY  2015         Social History - reviewed:  Social History     Socioeconomic History    Marital status:      Spouse name: Not on file    Number of children: Not on file    Years of education: Not on file    Highest education level: Not on file   Occupational History    Not on file   Tobacco Use    Smoking status: Never Smoker    Smokeless tobacco: Never Used   Substance and Sexual Activity    Alcohol use: Yes    Drug use: Never    Sexual activity: Not on file   Other Topics Concern    Not on file   Social History Narrative    Not on file     Social Determinants of Health     Financial Resource Strain:     Difficulty of Paying Living Expenses: Not on file   Food Insecurity:     Worried About Running Out of Food in the Last Year: Not on file    Ayanna of Food in the Last Year: Not on file   Transportation Needs:     Lack of Transportation (Medical): Not on file    Lack of Transportation (Non-Medical):  Not on file   Physical Activity: Insufficiently Active    Days of Exercise per Week: 3 days    Minutes of Exercise per Session: 20 min   Stress:     Feeling of Stress : Not on file   Social Connections:     Frequency of Communication with Friends and Family: Not on file    Frequency of Social Gatherings with Friends and Family: Not on file    Attends Islam Services: Not on file    Active Member of Clubs or Organizations: Not on file    Attends Club or Organization Meetings: Not on file    Marital Status: Not on file   Intimate Partner Violence: Not At Risk    Fear of Current or Ex-Partner: No    Emotionally Abused: No    Physically Abused: No    Sexually Abused: No   Housing Stability:     Unable to Pay for Housing in the Last Year: Not on file    Number of [place] in the Last Year: Not on file    Unstable Housing in the Last Year: Not on file         Family History - reviewed:  Family History   Problem Relation Age of Onset    Asthma Mother     Supraventricular tachycardia Mother     Thyroid Disease Father     Diabetes Father     Migraines Father     Migraines Brother     Asthma Brother     Depression Brother     ADD / ADHD Brother     Heart Attack Maternal Grandmother     Other Maternal Grandmother         Diverticulitis    Asthma Maternal Grandmother     Cancer Maternal Grandfather         lung (smoker)    Alcohol abuse Maternal Grandfather     Liver Disease Maternal Grandfather     Stroke Paternal Grandmother     Seizures Paternal Grandmother     Heart Failure Paternal Grandfather     Abdominal aortic aneurysm Paternal Grandfather          Immunizations - reviewed:   Immunization History   Administered Date(s) Administered    COVID-19, Pfizer Purple top, DILUTE for use, 12+ yrs, 30mcg/0.3mL dose 03/24/2021, 04/16/2021    Influenza Vaccine (Quadrivalent)(>18 Yrs [employer] 11165) 03/03/2021, 12/14/2021       ROS: see HPI    Physical Exam  Visit Vitals  /88 (BP 1 Location: Left upper arm, BP Patient Position: Sitting, BP Cuff Size: Adult)   Pulse 100   Temp 97 °F (36.1 °C) (Temporal)   Resp 16   Wt 152 lb (68.9 kg)   SpO2 98%   BMI 25.29 kg/m²       General: No acute distress. HEENT: Conjunctiva are clear, sclera non-icteric.   Respiratory: Normal work of breathing, talking in full sentences without issue  Musculoskeletal: Normal gait. Skin: No abnormalities or rashes   Neuro: Alert, oriented, speech clear and coherent, EOMI, moving all extremities equally  Psychiatric: Normal mood and affect      Assessment/Plan  1. Migraine without aura and without status migrainosus, not intractable  More frequent and longer lasting. Identified anxiety as one possible trigger that has recently worsened (ran out of previous medication). On sub-optimal dose of propranolol. Will change duloxetine to venlafaxine for migraine prophylaxis and treatment of anxiety. Will increase propranolol to 40mg, consider 80mg next visit. Has appointment with neurology in June. - venlafaxine-SR (EFFEXOR-XR) 37.5 mg capsule; Take 1 Capsule by mouth daily. Dispense: 7 Capsule; Refill: 14  - venlafaxine-SR (EFFEXOR-XR) 75 mg capsule; Take 1 Capsule by mouth daily. Dispense: 90 Capsule; Refill: 1    2. Intractable migraine without aura and without status migrainosus  See above  - propranoloL (INDERAL) 40 mg tablet; Take 1 Tablet by mouth daily. Dispense: 90 Tablet; Refill: 1    3. Family history of thyroid disease  Pt reports nausea, hunger, and family history of both hypo and hyperthyroidism.   - TSH 3RD GENERATION; Future  - TSH 3RD GENERATION    4. Generalized anxiety disorder  Previously well controlled with duloxetine, switch to venlafaxine for migraine control as above. Check TSH given worsened anxiety recently.   - TSH 3RD GENERATION; Future  - TSH 3RD GENERATION        Follow-up and Dispositions    · Return in about 3 months (around 8/12/2022) for Migraine treatment. I have discussed the diagnosis with the patient and the intended plan as seen in the above orders. Patient verbalized understanding of the plan and agrees with the plan. The patient has received an after-visit summary and questions were answered concerning future plans. I have discussed medication side effects and warnings with the patient as well.  Informed patient to return to the office if new symptoms arise.         Cinthia Cornejo MD

## 2022-05-13 LAB — TSH SERPL DL<=0.05 MIU/L-ACNC: 1.47 UIU/ML (ref 0.36–3.74)

## 2022-06-29 ENCOUNTER — OFFICE VISIT (OUTPATIENT)
Dept: NEUROLOGY | Age: 27
End: 2022-06-29
Payer: COMMERCIAL

## 2022-06-29 VITALS
WEIGHT: 145.8 LBS | BODY MASS INDEX: 24.29 KG/M2 | DIASTOLIC BLOOD PRESSURE: 80 MMHG | HEIGHT: 65 IN | OXYGEN SATURATION: 95 % | SYSTOLIC BLOOD PRESSURE: 106 MMHG | RESPIRATION RATE: 16 BRPM

## 2022-06-29 DIAGNOSIS — G43.711 INTRACTABLE CHRONIC MIGRAINE WITHOUT AURA AND WITH STATUS MIGRAINOSUS: Primary | ICD-10-CM

## 2022-06-29 PROCEDURE — 99204 OFFICE O/P NEW MOD 45 MIN: CPT | Performed by: PSYCHIATRY & NEUROLOGY

## 2022-06-29 RX ORDER — FREMANEZUMAB-VFRM 225 MG/1.5ML
225 INJECTION SUBCUTANEOUS
Qty: 1.5 ML | Refills: 3 | Status: SHIPPED | OUTPATIENT
Start: 2022-06-29 | End: 2022-09-20 | Stop reason: SDUPTHER

## 2022-06-29 RX ORDER — FREMANEZUMAB-VFRM 225 MG/1.5ML
225 INJECTION SUBCUTANEOUS ONCE
Qty: 1.5 ML | Refills: 0 | Status: SHIPPED | COMMUNITY
Start: 2022-06-29 | End: 2022-06-29

## 2022-06-29 RX ORDER — CELECOXIB 120 MG/4.8ML
60 LIQUID ORAL AS NEEDED
Qty: 9.6 ML | Refills: 0 | Status: SHIPPED | COMMUNITY
Start: 2022-06-29 | End: 2022-09-20 | Stop reason: SDUPTHER

## 2022-06-29 RX ORDER — CELECOXIB 120 MG/4.8ML
1 LIQUID ORAL AS NEEDED
Qty: 8 BOTTLE | Refills: 3 | Status: SHIPPED | OUTPATIENT
Start: 2022-06-29 | End: 2022-09-20 | Stop reason: SDUPTHER

## 2022-06-29 NOTE — PROGRESS NOTES
715 [facility] and [address] at [facility] at [facility]1 [address], [address] [facility] 9271 [address] [facility], [address] [place] [address]  [phone] Office  [phone]           Referring: Ghazala Tineo, [place] [address] [address],  Wai Longo [address]    Chief Complaint   Patient presents with    New Patient    Migraine     since highschool (2014)  will get approx 3-7 days per week. will start at back of neck or temple. photophobia, nausea, eye pain, occasional dizziness (those occur a few times a yr but this yr has already had 3)  migraine episode will last until taking an abortive. the \"knock out migraines\" will last about 12-18 hrs. Abortives include rizatriptan (works better) and ubrelvy. Preventitives include propranolol, amitriptyline, tpx.  has had occipital NB which helped    Neck Pain     has had triggerpoint injections previously with neuro in [place]     73-year-old lady who presents today for for neurologic consultation regarding worsening headaches. She started having headaches back when she was in high school and notes that they have an increased in terms of intensity and frequency. Right now she is having 3-7 days/week of intense headache. On average she has 20+ headache days per month. She notes they typically occur in the temporal region right greater than left and escalate. She has associated neck pain. She was following with neurology in [place]. She has had occipital nerve blocks. She is a trigger point ejections. She is at physical therapy. She does with the headaches get photophobia and nausea. No phonophobia or vomiting. No focal deficits.   No aura    Preventatives tried and failed include  Propranolol  Elavil  Topamax  Effexor    Abortives tried and failed include  Imitrex  Maxalt    She presently uses Ubrelvy and about 60-70% of the time headaches are aborted with 1 tablet and she gets no rebound like she has with the triptans. When she redosed that she typically uses Effexor or Tylenol. Past Medical History:   Diagnosis Date    Migraine     Migraines        Past Surgical History:   Procedure Laterality Date    HX SEPTOPLASTY  2015       Current Outpatient Medications   Medication Sig Dispense Refill    venlafaxine-SR (EFFEXOR-XR) 75 mg capsule Take 1 Capsule by mouth daily. 90 Capsule 1    propranoloL (INDERAL) 40 mg tablet Take 1 Tablet by mouth daily. 90 Tablet 1    rizatriptan (MAXALT) 10 mg tablet Take 10 mg by mouth once as needed for Migraine. May repeat in 2 hours if needed      ubrogepant Pecolia Delmar) 100 mg tablet Take 100 mg by mouth once as needed for Migraine.  norethindrone (MICRONOR) 0.35 mg tab Take 1 Tablet by mouth daily. 3 Dose Pack 1        No Known Allergies    Social History     Tobacco Use    Smoking status: Never Smoker    Smokeless tobacco: Never Used   Vaping Use    Vaping Use: Never used   Substance Use Topics    Alcohol use:  Yes     Alcohol/week: 3.0 standard drinks     Types: 2 Glasses of wine, 1 Standard drinks or equivalent per week    Drug use: Never       Family History   Problem Relation Age of Onset    Asthma Mother     Supraventricular tachycardia Mother     Thyroid Disease Father     Diabetes Father     Migraines Father     Headache Father     Migraines Brother     Asthma Brother     Depression Brother     ADD / ADHD Brother     Heart Attack Maternal Grandmother     Other Maternal Grandmother         Diverticulitis    Asthma Maternal Grandmother     Heart Disease Maternal Grandmother     Cancer Maternal Grandfather         lung (smoker)    Alcohol abuse Maternal Grandfather     Liver Disease Maternal Grandfather     Stroke Paternal Grandmother     Seizures Paternal Grandmother     Heart Failure Paternal Grandfather     Abdominal aortic aneurysm Paternal Grandfather     Heart Disease Paternal Grandfather     Cancer Paternal Grandfather     Headache Brother     Migraines Brother     Mental Retardation Brother     Headache Brother     Migraines Brother     Mental Retardation Maternal Aunt      Review of Systems  Pertinent positives and negatives as noted. Examination  Visit Vitals  /80 (BP 1 Location: Left upper arm, BP Patient Position: Sitting, BP Cuff Size: Adult)   Resp 16   Ht 5' 5\" (1.651 m)   Wt 66.1 kg (145 lb 12.8 oz)   SpO2 95%   BMI 24.26 kg/m²     Neurologically, she is awake, alert, and oriented with normal speech and language. Her cognition is normal.    Intact cranial nerves 2-12. No nystagmus. She has normal bulk and tone. She has no abnormal movement. She has no pronation or drift. She generates full strength in the upper and lower extremities to direct confrontational testing. Reflexes are symmetrical in the upper and lower extremities bilaterally. No pathologic reflexes are elicited. Finger nose finger and rapid alternating movements are normal.  Steady gait. Impression/Plan  28-year-old lady with intractable chronic migraine with failure of multiple abortive agents as well as multiple preventatives  Start Ajovy in a customary fashion we discussed expectations and potential side effect  Continue Ubrelvy and add Elyxyb if she has 3 dose  Track headaches  Follow-up in 3 months    Aayush Tolbert MD          This note was created using voice recognition software. Despite editing, there may be syntax errors.

## 2022-06-29 NOTE — PATIENT INSTRUCTIONS
RESULT POLICY      If we have ordered testing for you, know that; \"NO NEWS IS GOOD NEWS! \"     It is our policy that we no longer call patients with results, nor do we  give test results over the phone. We schedule follow up appointments so that your results can be discussed in person. This allows you to address any questions you have regarding the results. If you choose to go to an imaging center outside of General acute hospital, it is your responsibility to bring imaging report and disc to follow up appointment. If something of concern is revealed on your test, we will contact you to discuss the matter and if needed schedule a sooner follow up appointment. Additionally, results may be found by using the My Chart feature and one of our patient service representatives at the  can give you instructions on how to access this feature to utilize our electronic medical record system. Thank you for your understanding. 10 [facility]   Statement to Patients  April 1, 2014      In an effort to ensure the large volume of patient prescription refills is processed in the most efficient and expeditious manner, we are asking our patients to assist us by calling your Pharmacy for all prescription refills, this will include also your  Mail Order Pharmacy. The pharmacy will contact our office electronically to continue the refill process. Please do not wait until the last minute to call your pharmacy. We need at least 48 hours (2days) to fill prescriptions. We also encourage you to call your pharmacy before going to  your prescription to make sure it is ready. With regard to controlled substance prescription refill requests (narcotic refills) that need to be picked up at our office, we ask your cooperation by providing us with at least 72 hours (3days) notice that you will need a refill.     We will not refill narcotic prescription refill requests after 4:00pm on any weekday, Monday through Thursday, or after 2:00pm on Fridays, or on the weekends. We encourage everyone to explore another way of getting your prescription refill request processed using [employer], our patient web portal through our electronic medical record system. [employer] is an efficient and effective way to communicate your medication request directly to the office and  downloadable as an suman on your smart phone . [employer] also features a review functionality that allows you to view your medication list as well as leave messages for your physician. Are you ready to get connected? If so please review the attatched instructions or speak to any of our staff to get you set up right away! Thank you so much for your cooperation. Should you have any questions please contact our Practice Administrator.

## 2022-08-02 ENCOUNTER — TELEPHONE (OUTPATIENT)
Dept: NEUROLOGY | Age: 27
End: 2022-08-02

## 2022-08-02 NOTE — TELEPHONE ENCOUNTER
RE:Ajovy update  Key: [id number]        [facility] notification back from [facility]:     JZMQIE:[id number];  Status:Approved; Review Type:Prior Auth; Coverage Start Date:07/03/2022;   Coverage End Date:08/02/2023;        Nurse notified  Brodie kunz

## 2022-08-15 ENCOUNTER — NURSE TRIAGE (OUTPATIENT)
Dept: OTHER | Facility: CLINIC | Age: 27
End: 2022-08-15

## 2022-08-15 NOTE — TELEPHONE ENCOUNTER
Received call from [place] at [facility] with Red Flag Complaint. Subjective: Caller states \"I have had  neck pain for about 1 year. And BM's had been occurring differently. }\"     Current Symptoms: constant neck pain- numbness/tingling at times. Swelling on the trapezius muscle- usually on the right side. Does have occasional spasm of right muscle in upper back. .Began dayron ate 2020. Has seen neurologist and got trigger point injections and did PT. No injury, difficulty breathing, radiating. BM's multiple(2-4)times per day to having none in a day, then process repeats. Began about 1-2 months ago. No abdominal pain    Intermittent lower back pain. Onset: 1 year ago;for neck and worsening    Associated Symptoms: reduced activity, diarrhea, constipation    Pain Severity: 3-8/10; aching and throbbing; constant    Temperature: none     What has been tried: PT trigger point and injection, chiropractor, stress therapy, heat ice, advil, naproxyn, aleve    LMP: NA Pregnant: No    Recommended disposition: See PCP within 3 Days    Care advice provided, patient verbalizes understanding; denies any other questions or concerns; instructed to call back for any new or worsening symptoms. Patient/Caller agrees with recommended disposition; writer provided warm transfer to Jessee Estevez at [facility] for appointment scheduling    Attention Provider: Thank you for allowing me to participate in the care of your patient. The patient was connected to triage in response to information provided to the [facility]. Please do not respond through this encounter as the response is not directed to a shared pool.         Reason for Disposition   [1] MODERATE neck pain (e.g., interferes with normal activities) AND [2] present > 3 days    Protocols used: Neck Pain or Stiffness-ADULT-

## 2022-08-16 ENCOUNTER — OFFICE VISIT (OUTPATIENT)
Dept: FAMILY MEDICINE CLINIC | Age: 27
End: 2022-08-16
Payer: COMMERCIAL

## 2022-08-16 VITALS
HEIGHT: 65 IN | SYSTOLIC BLOOD PRESSURE: 105 MMHG | BODY MASS INDEX: 25.49 KG/M2 | DIASTOLIC BLOOD PRESSURE: 69 MMHG | HEART RATE: 83 BPM | OXYGEN SATURATION: 98 % | TEMPERATURE: 97.8 F | WEIGHT: 153 LBS | RESPIRATION RATE: 17 BRPM

## 2022-08-16 DIAGNOSIS — M54.2 NECK PAIN: Primary | ICD-10-CM

## 2022-08-16 DIAGNOSIS — G43.009 MIGRAINE WITHOUT AURA AND WITHOUT STATUS MIGRAINOSUS, NOT INTRACTABLE: ICD-10-CM

## 2022-08-16 PROCEDURE — 99213 OFFICE O/P EST LOW 20 MIN: CPT | Performed by: STUDENT IN AN ORGANIZED HEALTH CARE EDUCATION/TRAINING PROGRAM

## 2022-08-16 RX ORDER — DICLOFENAC SODIUM 10 MG/G
4 GEL TOPICAL 4 TIMES DAILY
Qty: 1 EACH | Refills: 0 | Status: SHIPPED | OUTPATIENT
Start: 2022-08-16

## 2022-08-16 NOTE — PROGRESS NOTES
Chief Complaint   Patient presents with    Neck Pain     Times 2 years       1. Have you been to the ER, urgent care clinic since your last visit? Hospitalized since your last visit? No    2. Have you seen or consulted any other health care providers outside of the [address] [place] [address] since your last visit? Include any pap smears or colon screening.  No

## 2022-08-16 NOTE — PROGRESS NOTES
[address] [place] [address]   Office [phone], Fax [phone]      Chief Complaint:     Chief Complaint   Patient presents with    Neck Pain     Times 2 years         Douglas York is a 24 y.o. female that presents for: neck pain      Subjective:   HPI:  Douglas York is a 24 y.o. female that presents for:    Neck pain: denies initial injury/car accident. Patient was seen by Dr. Tien Cespedes and trigger point injection which helped. Usually R sided from trap muscle into base of head. Today does feel more bilaterally. Denies vision changes, numbness/tingling, chest pain, SOB. Constant, interfering with her daily life. Been taking advil, CBD gummies. BM changes: Patient does report constipation alternating with diarrhea. But denies loss of bladder or bowel control. Migraines: Follows with Dr. Cris Berkowitz. Still gets migraines a few times a week. Neck pain will cause migraines to flare. Does get dizziness with migraines chronically. Between neck pain and migraines patient has really struggled with work and ADLs which has prompted her to follow up. She denies SI, reports good support from . Has started counseling as well which helps a lot. ROS: See HPI. Past medical history, social history, medications, and allergies personally reviewed. Past Medical History:   Diagnosis Date    Migraine     Migraines         Social Hx:   Alcohol: once or twice a week, a couple drinks  Tobacco: never  Illicit drug use: never    Medications:   Current Outpatient Medications   Medication Sig    diclofenac (VOLTAREN) 1 % gel Apply 4 g to affected area four (4) times daily. fremanezumab-vfrm (Ajovy Autoinjector) 225 mg/1.5 mL auto-injector 1.5 mL by SubCUTAneous route every month.    ubrogepant (Ubrelvy) 100 mg tablet 1 at migraine onset and repeat in 2 hours x 1 prn    celecoxib (Elyxyb) 120 mg/4.8 mL (25 mg/mL) soln Take 1 Bottle by mouth as needed (refractory migraine).     venlafaxine-SR (EFFEXOR-XR) 75 mg capsule Take 1 Capsule by mouth daily. propranoloL (INDERAL) 40 mg tablet Take 1 Tablet by mouth daily. norethindrone (MICRONOR) 0.35 mg tab Take 1 Tablet by mouth daily. celecoxib (Elyxyb) 120 mg/4.8 mL (25 mg/mL) soln Take 60 mg by mouth as needed (migraine). No current facility-administered medications for this visit. Allergies:  No Known Allergies     Health Maintenance:  Health Maintenance Due   Topic Date Due    Hepatitis C Screening  Never done    HPV Age 9Y-34Y (1 - 2-dose series) Never done    DTaP/Tdap/Td series (1 - Tdap) Never done    Pap Smear  Never done    COVID-19 Vaccine (3 - Booster for Pfizer series) 09/16/2021        Objective:   Vitals reviewed. Visit Vitals  /69   Pulse 83   Temp 97.8 °F (36.6 °C) (Temporal)   Resp 17   Ht 5' 5\" (1.651 m)   Wt 153 lb (69.4 kg)   SpO2 98%   BMI 25.46 kg/m²        Physical Exam:  General Alert. No distress. Not diaphoretic. No jaundice, cyanosis, pallor. HENT Head Normocephalic and atraumatic. Eyes Conjunctivae pink, no discharge. No scleral icterus. EOMI. Cardio Normal rate, regular rhythm. No murmur, gallop, or friction rub. No chest wall tenderness. Pulmonary Effort normal. Breath sounds normal. No respiratory distress. No wheezes, rales, or rhonchi. No Stridor. MSK Normal AROM of neck. TTP of R upper trapezius and L cervical paraspinal muscles. Patient without spinous process tenderness. Neurological No focal deficits. Skin Skin is warm and dry. No rash noted. No erythema. Psychiatric Mood, affect, and judgment normal.        Assessment/Plan:     1. Neck pain  Likely multifactorial. Patient without red flag symptoms. Seems mainly muscular in nature, specifically trapezius tightness. Discussed migraines may be causing neck pain from tensing as well. She felt relief from previous trigger point injection with Dr. FREIRE [facility].  Thought patient today was actually for  and would like to be seen by [facility] again.   - Diclofenac topically until follow up with SM    2. Migraine without aura and without status migrainosus, not intractable   Discussed avoiding too much NSAID use to help prevent rebound headaches. Discussed patient should return to neurology office sooner than 9/20 as her migraines are not improving on current regimen and they may be able to make adjustments. Patient denies SI or self harm thoughts and PHQ 0 but given suicide hotline and depression information as patient does feel her QOL is significantly decreased due to chronic pain. Will have patient follow up after  visit. Follow up:    suman on 8/22 with Dr. Franchesca Ramirez. Pt was discussed with Dr Raven Gutiérrez (attending physician). I have reviewed pertinent labs results and other data. I have discussed the diagnosis with the patient and the intended plan as seen in the above orders. The patient has received an after-visit summary and questions were answered concerning future plans. I have discussed medication side effects and warnings with the patient as well.     Anali Ramirez MD  Resident 01 [facility]  08/17/22

## 2022-08-23 DIAGNOSIS — G43.019 INTRACTABLE MIGRAINE WITHOUT AURA AND WITHOUT STATUS MIGRAINOSUS: ICD-10-CM

## 2022-08-24 RX ORDER — PROPRANOLOL HYDROCHLORIDE 40 MG/1
TABLET ORAL
Qty: 90 TABLET | Refills: 1 | Status: SHIPPED | OUTPATIENT
Start: 2022-08-24

## 2022-09-12 ENCOUNTER — OFFICE VISIT (OUTPATIENT)
Dept: FAMILY MEDICINE CLINIC | Age: 27
End: 2022-09-12

## 2022-09-12 VITALS
HEIGHT: 65 IN | WEIGHT: 153 LBS | RESPIRATION RATE: 18 BRPM | DIASTOLIC BLOOD PRESSURE: 68 MMHG | HEART RATE: 70 BPM | TEMPERATURE: 98 F | BODY MASS INDEX: 25.49 KG/M2 | SYSTOLIC BLOOD PRESSURE: 100 MMHG | OXYGEN SATURATION: 98 %

## 2022-09-12 DIAGNOSIS — M54.2 NECK PAIN: Primary | ICD-10-CM

## 2022-09-12 DIAGNOSIS — M62.838 TRAPEZIUS MUSCLE SPASM: ICD-10-CM

## 2022-09-12 PROCEDURE — 20552 NJX 1/MLT TRIGGER POINT 1/2: CPT | Performed by: FAMILY MEDICINE

## 2022-09-12 PROCEDURE — 99213 OFFICE O/P EST LOW 20 MIN: CPT | Performed by: FAMILY MEDICINE

## 2022-09-12 NOTE — PROGRESS NOTES
Identified Patient with two Patient identifiers (Name and ). Two Patient Identifiers confirmed. Reviewed record in preparation for visit and have obtained necessary documentation. Chief Complaint   Patient presents with    Neck Pain     Patient with right sided neck and trap/shoulder pain x 2 years - patient has done 3 rounds of PT (last was -March of this year) that has helped, but pain is not going away. Patient recently quit working because it was worsening the pain. Patient previously had injections which helped for about 4 weeks. Visit Vitals  /68 (BP 1 Location: Right arm, BP Patient Position: Sitting, BP Cuff Size: Adult)   Pulse 70   Temp 98 °F (36.7 °C) (Temporal)   Resp 18   Ht 5' 5\" (1.651 m)   Wt 153 lb (69.4 kg)   SpO2 98%   BMI 25.46 kg/m²       1. Have you been to the ER, urgent care clinic since your last visit? Hospitalized since your last visit? No    2. Have you seen or consulted any other health care providers outside of the [address] [place] [address] since your last visit? Include any pap smears or colon screening.  No

## 2022-09-12 NOTE — PROGRESS NOTES
[address] [facility]      Chief Complaint:   Chief Complaint   Patient presents with    Neck Pain     Patient with right sided neck and trap/shoulder pain x 2 years - patient has done 3 rounds of PT (last was Jan-March of this year) that has helped, but pain is not going away. Patient recently quit working because it was worsening the pain. Patient previously had injections which helped for about 4 weeks. Subjective:      Diana Lopez is an 24 y.o. female who presents for evaluation and treatment   of neck pain. Chronically intermittent neck pain. Mostly over the right trap. No injury or trauma. No radiating pain.       Past Medical History:   Diagnosis Date    Migraine     Migraines      Family History   Problem Relation Age of Onset    Asthma Mother     Supraventricular tachycardia Mother     Thyroid Disease Father     Diabetes Father     Migraines Father     Headache Father     Migraines Brother     Asthma Brother     Depression Brother     ADD / ADHD Brother     Heart Attack Maternal Grandmother     Other Maternal Grandmother         Diverticulitis    Asthma Maternal Grandmother     Heart Disease Maternal Grandmother     Cancer Maternal Grandfather         lung (smoker)    Alcohol abuse Maternal Grandfather     Liver Disease Maternal Grandfather     Stroke Paternal Grandmother     Seizures Paternal Grandmother     Heart Failure Paternal Grandfather     Abdominal aortic aneurysm Paternal Grandfather     Heart Disease Paternal Grandfather     Cancer Paternal Grandfather     Headache Brother     Migraines Brother     Mental Retardation Brother     Headache Brother     Migraines Brother     Mental Retardation Maternal Aunt      Current Outpatient Medications   Medication Sig Dispense Refill    propranoloL (INDERAL) 40 mg tablet TAKE 1 TABLET BY MOUTH EVERY DAY 90 Tablet 1    diclofenac (VOLTAREN) 1 % gel Apply 4 g to affected area four (4) times daily. 1 Each 0    fremanezumab-vfrm (Ajovy Autoinjector) 225 mg/1.5 mL auto-injector 1.5 mL by SubCUTAneous route every month. 1.5 mL 3    ubrogepant (Ubrelvy) 100 mg tablet 1 at migraine onset and repeat in 2 hours x 1 prn 16 Tablet 3    celecoxib (Elyxyb) 120 mg/4.8 mL (25 mg/mL) soln Take 1 Bottle by mouth as needed (refractory migraine). 8 Bottle 3    celecoxib (Elyxyb) 120 mg/4.8 mL (25 mg/mL) soln Take 60 mg by mouth as needed (migraine). 9.6 mL 0    venlafaxine-SR (EFFEXOR-XR) 75 mg capsule Take 1 Capsule by mouth daily. 90 Capsule 1    norethindrone (MICRONOR) 0.35 mg tab Take 1 Tablet by mouth daily. 3 Dose Pack 1     No Known Allergies  Social History     Socioeconomic History    Marital status:      Spouse name: Not on file    Number of children: Not on file    Years of education: Not on file    Highest education level: Not on file   Occupational History    Not on file   Tobacco Use    Smoking status: Never    Smokeless tobacco: Never   Vaping Use    Vaping Use: Never used   Substance and Sexual Activity    Alcohol use:  Yes     Alcohol/week: 3.0 standard drinks     Types: 2 Glasses of wine, 1 Standard drinks or equivalent per week    Drug use: Never    Sexual activity: Yes     Partners: Male     Birth control/protection: Condom, Pill   Other Topics Concern    Not on file   Social History Narrative    Not on file     Social Determinants of Health     Financial Resource Strain: Not on file   Food Insecurity: Not on file   Transportation Needs: Not on file   Physical Activity: Insufficiently Active    Days of Exercise per Week: 3 days    Minutes of Exercise per Session: 20 min   Stress: Not on file   Social Connections: Not on file   Intimate Partner Violence: Not At Risk    Fear of Current or Ex-Partner: No    Emotionally Abused: No    Physically Abused: No    Sexually Abused: No   Housing Stability: Not on file       Review of Systems  Pertinent items are noted in HPI.    Objective:     Visit Vitals  /68 (BP 1 Location: Right arm, BP Patient Position: Sitting, BP Cuff Size: Adult)   Pulse 70   Temp 98 °F (36.7 °C) (Temporal)   Resp 18   Ht 5' 5\" (1.651 m)   Wt 153 lb (69.4 kg)   SpO2 98%   BMI 25.46 kg/m²     General: Alert and oriented and in no acute distress. Responds to all questions appropriately. SKIN: No rash. EYES: Sclera and conjunctiva clear. LUNGS: Clear to auscultation bilaterally, no wheeze, rales and rhonchi. CARDIOVASCULAR: Regular, rate, and rhythm without murmurs, gallops or rubs. ABDOMEN: Soft; nontender; nondistended; normoactive bowel sounds; no masses or organomegaly. NEUROLOGIC: Speech intact; face symmetrical; moves all extremities equally. MSK:    Posture: Normal   Deformity: None    ROM - Cervical spine:     Flexion: Normal     Extension: Normal     Lateral bending: Normal    Upper Ext: FROM bilaterally       Palpation:    C1 - T1 tenderness: None    Trapezious tenderness: tenderness in the right side with 1 trigger point identified. Strength (0-5/5):    :   Left: 5/5  Right: 5/5    Wrist Extension:  Left: 5/5  Right: 5/5    Wrist Flexion:  Left: 5/5  Right: 5/5    Elbow Flextion: Left: 5/5  Right: 5/5    Elbow Extension:  Left: 5/5  Right: 5/5    Shoulder Flexion:  Left: 5/5  Right: 5/5    Shoulder Abduction:  Left: 5/5  Right: 5/5    Shouder Ext Rotation: Left: 5/5  Right: 5/5    Shoulder Int Rotation: Left: 5/5  Right: 5/5       Sensation: Intact, no deficits in the upper ext bilaterally. EXTREMITIES: Well perfused. Moving all extremities equally. PROCEDURE NOTE:     Informed consent - Risks, benefits and alternatives discussed. Time Out - Performed, cross checking patient ID and procedure. Preparation - Cleaned and prepped with alcohol swab x3. Anesthesia - Ethyl chloride spray used to anesthitise the skin prior to injection.    Description of procedure - 1 trigger point identified and each injected with 0.5 ml of dexamethasone and 2 ml of 1% lidocaine mixture under sterile conditions. Patient tolerated the procedure well and there were no complications. Patient reports pain relief following the injections. Assessment:       ICD-10-CM ICD-9-CM    1. Neck pain  M54.2 723.1 XR SPINE CERV PA LAT ODONT 3 V MAX      Trap spasm     Plan:   1. Home Exercise Program as per handout. 2. Ice 15 minutes, three times a day for 48 hours. 3. Trigger point injection  4. TENS unit    Medications:    1. Ibuprofen (Motrin, Advil):  200mg - take 1-4 tablets three times a day for 5 days. -OR-    Naproxin (Aleve): 220mg 1-2 tablets twice a day for 5 days   2. Acetaminophen (Tylenol):  500mg 1-2 tablets every 6 hours as needed for pain.     RTC: PRN

## 2022-09-20 ENCOUNTER — OFFICE VISIT (OUTPATIENT)
Dept: NEUROLOGY | Age: 27
End: 2022-09-20
Payer: COMMERCIAL

## 2022-09-20 VITALS
OXYGEN SATURATION: 98 % | HEART RATE: 60 BPM | RESPIRATION RATE: 14 BRPM | SYSTOLIC BLOOD PRESSURE: 106 MMHG | DIASTOLIC BLOOD PRESSURE: 72 MMHG

## 2022-09-20 DIAGNOSIS — G43.711 INTRACTABLE CHRONIC MIGRAINE WITHOUT AURA AND WITH STATUS MIGRAINOSUS: Primary | ICD-10-CM

## 2022-09-20 PROCEDURE — 99213 OFFICE O/P EST LOW 20 MIN: CPT | Performed by: PSYCHIATRY & NEUROLOGY

## 2022-09-20 RX ORDER — CELECOXIB 120 MG/4.8ML
1 LIQUID ORAL AS NEEDED
Qty: 8 BOTTLE | Refills: 5 | Status: SHIPPED | OUTPATIENT
Start: 2022-09-20

## 2022-09-20 RX ORDER — FREMANEZUMAB-VFRM 225 MG/1.5ML
225 INJECTION SUBCUTANEOUS
Qty: 1.5 ML | Refills: 5 | Status: SHIPPED | OUTPATIENT
Start: 2022-09-20

## 2022-09-20 NOTE — PROGRESS NOTES
[facility] and [address] at [facility] at [address], [address] [facility] [address] [facility], [address] [place] [address]   [phone]              Chief Complaint   Patient presents with    Migraine     Doing very well with Ajovy last dose 9/3/22 uses Ubrelvy as 1st resort to abort HA and this is first time she has not needed all doses. Elyxyb as 2nd resort. Current Outpatient Medications   Medication Sig Dispense Refill    propranoloL (INDERAL) 40 mg tablet TAKE 1 TABLET BY MOUTH EVERY DAY 90 Tablet 1    diclofenac (VOLTAREN) 1 % gel Apply 4 g to affected area four (4) times daily. 1 Each 0    fremanezumab-vfrm (Ajovy Autoinjector) 225 mg/1.5 mL auto-injector 1.5 mL by SubCUTAneous route every month. 1.5 mL 3    ubrogepant (Ubrelvy) 100 mg tablet 1 at migraine onset and repeat in 2 hours x 1 prn 16 Tablet 3    celecoxib (Elyxyb) 120 mg/4.8 mL (25 mg/mL) soln Take 1 Bottle by mouth as needed (refractory migraine). 8 Bottle 3    venlafaxine-SR (EFFEXOR-XR) 75 mg capsule Take 1 Capsule by mouth daily. 90 Capsule 1    norethindrone (MICRONOR) 0.35 mg tab Take 1 Tablet by mouth daily. 3 Dose Pack 1      No Known Allergies  Social History     Tobacco Use    Smoking status: Never    Smokeless tobacco: Never   Vaping Use    Vaping Use: Never used   Substance Use Topics    Alcohol use: Yes     Alcohol/week: 3.0 standard drinks     Types: 2 Glasses of wine, 1 Standard drinks or equivalent per week    Drug use: Never     60-year-old lady returns today for follow-up intractable migraine headaches. Her last visit with me was in June of this year and at that time we started her on Ajovy for prevention and we gave her some Ubrelvy and elyxyb for abortive therapy. She notes it is the first time that she has not gone through all of her abortive medicines. She is no longer having rebound from triptans.   Still has some discomfort and spasm in the right trapezius and paracervical.  Working with sports medicine. We talked about some dry needling. [address] Rec[address] works very well. She has had to use the Elyxyb a couple of times. Tolerates medicine without difficulty. Gregory Holes also works well. Examination  Visit Vitals  /72 (BP 1 Location: Right arm, BP Patient Position: Sitting, BP Cuff Size: Adult)   Pulse 60   Resp 14   SpO2 98%     She is a very pleasant engaging lady. She is awake alert and oriented. Speech and language normal.  Cognition normal.  No ataxia. Steady gait    Impression/Plan  Intractable migraine headaches better  Continue our combination of Ajovy, Ubrelvy and Elyxyb  We will set follow-up for 4 months but if she is doing well she certainly can call and stretch that out for later    Keagan Huang MD        This note was created using voice recognition software. Despite editing, there may be syntax errors.

## 2022-10-18 DIAGNOSIS — Z87.42 HISTORY OF HEAVY PERIODS: ICD-10-CM

## 2022-10-19 RX ORDER — ACETAMINOPHEN AND CODEINE PHOSPHATE 120; 12 MG/5ML; MG/5ML
SOLUTION ORAL
Qty: 28 TABLET | Refills: 2 | Status: SHIPPED | OUTPATIENT
Start: 2022-10-19

## 2022-10-31 DIAGNOSIS — G43.009 MIGRAINE WITHOUT AURA AND WITHOUT STATUS MIGRAINOSUS, NOT INTRACTABLE: ICD-10-CM

## 2022-10-31 RX ORDER — VENLAFAXINE HYDROCHLORIDE 75 MG/1
75 CAPSULE, EXTENDED RELEASE ORAL DAILY
Qty: 90 CAPSULE | Refills: 1 | Status: SHIPPED | OUTPATIENT
Start: 2022-10-31

## 2022-10-31 NOTE — TELEPHONE ENCOUNTER
Patient stated that she has been out of this medication for a few days and really needs it to be refilled soon. Thanks!

## 2022-11-01 RX ORDER — VENLAFAXINE HYDROCHLORIDE 75 MG/1
75 CAPSULE, EXTENDED RELEASE ORAL DAILY
Qty: 90 CAPSULE | Refills: 1 | OUTPATIENT
Start: 2022-11-01

## 2023-01-18 DIAGNOSIS — Z87.42 HISTORY OF HEAVY PERIODS: ICD-10-CM

## 2023-01-19 RX ORDER — ACETAMINOPHEN AND CODEINE PHOSPHATE 120; 12 MG/5ML; MG/5ML
SOLUTION ORAL
Qty: 84 TABLET | Refills: 1 | Status: SHIPPED | OUTPATIENT
Start: 2023-01-19

## 2023-03-30 NOTE — PROGRESS NOTES
Sarahi Hummel is a 24 y.o. female returns for an annual exam     No chief complaint on file. No LMP recorded. Her periods are {bleeding vaginal:73081::\"normal\"} in flow and {cycle hx:16574::\"usually regular with a 26-32 day interval with 3-7 day duration\"}. She {has-does not have:70493901} dysmenorrhea. Problems: {problem:32322}  Birth Control: {contraception:792865}. Last Pap: {NORMAL_ABNORMAL:93052::\"see report\"} obtained {Numbers; 1-4 :15771057} year(s) ago. She {DOES_DOES ZUS:27371} have a history of NANCY 2, 3 or cervical cancer. With regard to the Gardisil vaccine, she {Gardisil :02871}. 1. Have you been to the ER, urgent care clinic, or hospitalized since your last visit? {Yes when where and reason for visit:20441}    2. Have you seen or consulted any other health care providers outside of the [address] [place] [address] since your last visit? {Yes when where and reason for visit:20441}    Examination chaperoned by Ortega Robles RN.

## 2023-03-31 ENCOUNTER — OFFICE VISIT (OUTPATIENT)
Dept: OBGYN CLINIC | Age: 28
End: 2023-03-31

## 2023-03-31 VITALS
HEIGHT: 66 IN | BODY MASS INDEX: 25.97 KG/M2 | DIASTOLIC BLOOD PRESSURE: 78 MMHG | SYSTOLIC BLOOD PRESSURE: 114 MMHG | WEIGHT: 161.6 LBS

## 2023-03-31 DIAGNOSIS — F52.5 VAGINISMUS (NOT DUE TO A GENERAL MEDICAL CONDITION): ICD-10-CM

## 2023-03-31 DIAGNOSIS — N92.6 IRREGULAR MENSTRUAL CYCLE: ICD-10-CM

## 2023-03-31 DIAGNOSIS — Z01.419 ENCOUNTER FOR GYNECOLOGICAL EXAMINATION: Primary | ICD-10-CM

## 2023-03-31 LAB
HCG URINE, QL. (POC): NEGATIVE
VALID INTERNAL CONTROL?: YES

## 2023-03-31 RX ORDER — NAPROXEN 500 MG/1
500 TABLET ORAL 2 TIMES DAILY WITH MEALS
Qty: 30 TABLET | Refills: 3 | Status: SHIPPED | OUTPATIENT
Start: 2023-03-31

## 2023-03-31 NOTE — PROGRESS NOTES
Jennifer Oshea is a No obstetric history on file. ,  24 y.o. female WHITE/NON- whose Patient's last menstrual period was 03/21/2023 (within days). was on 3/21/2023 who presents for her annual checkup. She has been told in the past that she probably has endometriosis due to her symptoms. She previously was on Depo-Provera and then switched to an oral contraceptives. The PCP apparently put her on Micronor. Sometimes she has cycles sometimes she does not and she has been having some irregular bleeding. She just recently stopped this. She would like to get pregnant in about a year. She does have a history of migraine but no aura. She is followed by Dr. Collette Clink. Has hx of vaginismus. Still has pain with IC. Had PT in the past - unclear if someone certified in women's health. Has dilators. Anxious about exam today      Menstrual status:    Her periods are moderatein flow, irregular    She denies dysmenorrhea. Contraception:    The current method of family planning is condoms always    Sexual history:    She  reports being sexually active and has had partner(s) who are male. She reports using the following method of birth control/protection: Condom. Pap and Mammogram History:    Patient's last menstrual period was 03/21/2023 (within days). Her periods: every 2-3 years periods seem to become irregular on her birth control pills. Since June 2022 had period every month, but should not have had a period (been on that since 2020; Micronor) Heavy flow for 3-5 days every 26 days. She does have dysmenorrhea. .  Problems:  headaches, should not be having periods, possibly  looking at getting pregnant in the future. Birth Control: condoms; stopped   Last Pap: normal; obtained 2019  She does not have a history of NANCY 2, 3 or cervical cancer. With regard to the Gardisil vaccine, she has not received it yet. The patient does not have a family history of breast cancer.   Family History   Problem Relation Age of Onset    Asthma Mother     Supraventricular tachycardia Mother     Thyroid Disease Father     Diabetes Father     Migraines Father     Headache Father     Migraines Brother     Asthma Brother     Depression Brother     ADD / ADHD Brother     Heart Attack Maternal Grandmother     Other Maternal Grandmother         Diverticulitis    Asthma Maternal Grandmother     Heart Disease Maternal Grandmother     Cancer Maternal Grandfather         lung (smoker)    Alcohol abuse Maternal Grandfather     Liver Disease Maternal Grandfather     Stroke Paternal Grandmother     Seizures Paternal Grandmother     Heart Failure Paternal Grandfather     Abdominal aortic aneurysm Paternal Grandfather     Heart Disease Paternal Grandfather     Cancer Paternal Grandfather     Headache Brother     Migraines Brother     Mental Retardation Brother     Headache Brother     Migraines Brother     Mental Retardation Maternal Aunt        Past Medical History:   Diagnosis Date    Endometriosis     Migraine     Migraines     Vaginismus      Past Surgical History:   Procedure Laterality Date    HX SEPTOPLASTY  2015       Current Outpatient Medications   Medication Sig Dispense Refill    venlafaxine-SR (EFFEXOR-XR) 75 mg capsule Take 1 Capsule by mouth daily. 90 Capsule 1    fremanezumab-vfrm (Ajovy Autoinjector) 225 mg/1.5 mL auto-injector 1.5 mL by SubCUTAneous route every month. 1.5 mL 5    ubrogepant (Ubrelvy) 100 mg tablet 1 at migraine onset and repeat in 2 hours x 1 prn 16 Tablet 5    celecoxib (Elyxyb) 120 mg/4.8 mL (25 mg/mL) soln Take 1 Bottle by mouth as needed (refractory migraine). 8 Bottle 5    propranoloL (INDERAL) 40 mg tablet TAKE 1 TABLET BY MOUTH EVERY DAY 90 Tablet 1     Allergies: Patient has no known allergies.    Social History     Socioeconomic History    Marital status:      Spouse name: Not on file    Number of children: Not on file    Years of education: Not on file    Highest education level: Not on file   Occupational History    Not on file   Tobacco Use    Smoking status: Never    Smokeless tobacco: Never   Vaping Use    Vaping Use: Never used   Substance and Sexual Activity    Alcohol use: Yes     Alcohol/week: 3.0 standard drinks     Types: 2 Glasses of wine, 1 Standard drinks or equivalent per week    Drug use: Yes     Frequency: 1.0 times per week     Types: Marijuana    Sexual activity: Yes     Partners: Male     Birth control/protection: Condom   Other Topics Concern    Not on file   Social History Narrative    Not on file     Social Determinants of Health     Financial Resource Strain: Not on file   Food Insecurity: Not on file   Transportation Needs: Not on file   Physical Activity: Insufficiently Active    Days of Exercise per Week: 3 days    Minutes of Exercise per Session: 20 min   Stress: Not on file   Social Connections: Not on file   Intimate Partner Violence: Not At Risk    Fear of Current or Ex-Partner: No    Emotionally Abused: No    Physically Abused: No    Sexually Abused: No   Housing Stability: Not on file     Tobacco History:  reports that she has never smoked. She has never used smokeless tobacco.  Alcohol Abuse:  reports current alcohol use of about 3.0 standard drinks per week. Drug Abuse:  reports current drug use. Frequency: 1.00 time per week. Drug: Marijuana.     Patient Active Problem List   Diagnosis Code    Migraine without aura and without status migrainosus, not intractable G43.009    Neck pain M54.2       Review of Systems - History obtained from the patient  Constitutional: negative for weight loss, fever, night sweats  HEENT: negative for hearing loss, earache, congestion, snoring, sorethroat  CV: negative for chest pain, palpitations, edema  Resp: negative for cough, shortness of breath, wheezing  GI: negative for change in bowel habits, abdominal pain, black or bloody stools  : negative for frequency, dysuria, hematuria, vaginal discharge  MSK: negative for back pain, joint pain, muscle pain  Breast: negative for breast lumps, nipple discharge, galactorrhea  Skin :negative for itching, rash, hives  Neuro: negative for dizziness, headache, confusion, weakness  Psych: negative for anxiety, depression, change in mood  Heme/lymph: negative for bleeding, bruising, pallor    Physical Exam    Visit Vitals  /78   Ht 5' 6\" (1.676 m)   Wt 161 lb 9.6 oz (73.3 kg)   LMP 03/21/2023 (Within Days)   BMI 26.08 kg/m²       Constitutional  Appearance: well-nourished, well developed, alert, in no acute distress    HENT  Head and Face: appears normal    Neck  Inspection/Palpation: normal appearance, no masses or tenderness  Lymph Nodes: no lymphadenopathy present  Thyroid: gland size normal, nontender, no nodules or masses present on palpation    Chest  Respiratory Effort: breathing normal  Auscultation: normal breath sounds    Cardiovascular  Heart:   Auscultation: regular rate and rhythm without murmur    Breasts  Inspection of Breasts: breasts symmetrical, no skin changes, no discharge present, nipple appearance normal, no skin retraction present  Palpation of Breasts and Axillae: no masses present on palpation, no breast tenderness  Axillary Lymph Nodes: no lymphadenopathy present    Gastrointestinal  Abdominal Examination: abdomen non-tender to palpation, normal bowel sounds, no masses present  Liver and spleen: no hepatomegaly present, spleen not palpable  Hernias: no hernias identified    Genitourinary  External Genitalia: normal appearance for age, no discharge present, no tenderness present, no inflammatory lesions present, no masses present, no atrophy present  Vagina: normal vaginal vault without central or paravaginal defects, no discharge present, no inflammatory lesions present, no masses present  Bladder: non-tender to palpation  Urethra: appears normal  Cervix: normal   Uterus: normal size, shape and consistency  Adnexa: no adnexal tenderness present, no adnexal masses present  Perineum: perineum within normal limits, no evidence of trauma, no rashes or skin lesions present  Anus: anus within normal limits, no hemorrhoids present  Inguinal Lymph Nodes: no lymphadenopathy present    Skin  General Inspection: no rash, no lesions identified    Neurologic/Psychiatric  Mental Status:  Orientation: grossly oriented to person, place and time  Mood and Affect: mood normal, affect appropriate    Results for orders placed or performed in visit on 03/31/23   AMB POC URINE PREGNANCY TEST, VISUAL COLOR COMPARISON   Result Value Ref Range    VALID INTERNAL CONTROL POC Yes     HCG urine, Ql. (POC) Negative Negative       Assessment:  Routine gynecologic examination  Her current medical status is satisfactory with no evidence of significant gynecologic issues. Presumed hx of endometriosis due to hx of dysmenorrhea  vaginismus  Plan:  Counseled re: diet, exercise, healthy lifestyle  Return for yearly wellness visits  Pap  Take vits  Naproxen. Pt advised Micronor will call irregular bleeding and is not adequate contraception.    Would benefit from PT  naproxen

## 2023-03-31 NOTE — PROGRESS NOTES
Dacia Neumann is a 24 y.o. female returns for an annual exam     Chief Complaint   Patient presents with    Well Woman       Patient's last menstrual period was 03/21/2023 (within days). Her periods: every 2-3 years periods seem to become irregular on her birth control pills. Since June 2022 had period every month, but should not have had a period (been on that since 2020; Micronor) Heavy flow for 3-5 days every 26 days. She does have dysmenorrhea. .  Problems:  headaches, should not be having periods, possibly  looking at getting pregnant in the future. Birth Control: condoms; stopped birth control 2 days ago  Last Pap: normal; obtained 2019  She does not have a history of NANCY 2, 3 or cervical cancer. With regard to the Gardisil vaccine, she has not received it yet. 1. Have you been to the ER, urgent care clinic, or hospitalized since your last visit? No    2. Have you seen or consulted any other health care providers outside of the [address] [place] [address] since your last visit? No    Examination chaperoned by Alejandra Dowd LPN.

## 2023-05-09 ENCOUNTER — NURSE TRIAGE (OUTPATIENT)
Dept: OTHER | Facility: CLINIC | Age: 28
End: 2023-05-09

## 2023-05-09 ENCOUNTER — OFFICE VISIT (OUTPATIENT)
Age: 28
End: 2023-05-09

## 2023-05-09 VITALS
BODY MASS INDEX: 25.88 KG/M2 | SYSTOLIC BLOOD PRESSURE: 93 MMHG | HEART RATE: 90 BPM | HEIGHT: 66 IN | TEMPERATURE: 98.4 F | DIASTOLIC BLOOD PRESSURE: 63 MMHG | OXYGEN SATURATION: 96 % | RESPIRATION RATE: 16 BRPM | WEIGHT: 161 LBS

## 2023-05-09 DIAGNOSIS — M54.2 NECK PAIN ON RIGHT SIDE: ICD-10-CM

## 2023-05-09 DIAGNOSIS — S09.90XA INJURY OF HEAD, INITIAL ENCOUNTER: Primary | ICD-10-CM

## 2023-05-09 SDOH — ECONOMIC STABILITY: FOOD INSECURITY: WITHIN THE PAST 12 MONTHS, YOU WORRIED THAT YOUR FOOD WOULD RUN OUT BEFORE YOU GOT MONEY TO BUY MORE.: NEVER TRUE

## 2023-05-09 SDOH — ECONOMIC STABILITY: FOOD INSECURITY: WITHIN THE PAST 12 MONTHS, THE FOOD YOU BOUGHT JUST DIDN'T LAST AND YOU DIDN'T HAVE MONEY TO GET MORE.: NEVER TRUE

## 2023-05-09 SDOH — ECONOMIC STABILITY: INCOME INSECURITY: HOW HARD IS IT FOR YOU TO PAY FOR THE VERY BASICS LIKE FOOD, HOUSING, MEDICAL CARE, AND HEATING?: SOMEWHAT HARD

## 2023-05-09 SDOH — ECONOMIC STABILITY: HOUSING INSECURITY
IN THE LAST 12 MONTHS, WAS THERE A TIME WHEN YOU DID NOT HAVE A STEADY PLACE TO SLEEP OR SLEPT IN A SHELTER (INCLUDING NOW)?: NO

## 2023-05-09 ASSESSMENT — PATIENT HEALTH QUESTIONNAIRE - PHQ9
SUM OF ALL RESPONSES TO PHQ QUESTIONS 1-9: 0
SUM OF ALL RESPONSES TO PHQ9 QUESTIONS 1 & 2: 0
1. LITTLE INTEREST OR PLEASURE IN DOING THINGS: 0
SUM OF ALL RESPONSES TO PHQ QUESTIONS 1-9: 0
SUM OF ALL RESPONSES TO PHQ QUESTIONS 1-9: 0
2. FEELING DOWN, DEPRESSED OR HOPELESS: 0
SUM OF ALL RESPONSES TO PHQ QUESTIONS 1-9: 0
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
## 2023-07-03 DIAGNOSIS — G43.711 CHRONIC MIGRAINE WITHOUT AURA, INTRACTABLE, WITH STATUS MIGRAINOSUS: Primary | ICD-10-CM

## 2023-07-05 RX ORDER — FREMANEZUMAB-VFRM 225 MG/1.5ML
1.5 INJECTION SUBCUTANEOUS
Qty: 1.5 ML | Refills: 1 | Status: SHIPPED | OUTPATIENT
Start: 2023-07-05

## 2023-08-01 DIAGNOSIS — G43.009 MIGRAINE WITHOUT AURA, NOT INTRACTABLE, WITHOUT STATUS MIGRAINOSUS: ICD-10-CM

## 2023-08-01 DIAGNOSIS — G43.019 MIGRAINE WITHOUT AURA, INTRACTABLE, WITHOUT STATUS MIGRAINOSUS: ICD-10-CM

## 2023-08-01 RX ORDER — PROPRANOLOL HYDROCHLORIDE 40 MG/1
TABLET ORAL
Qty: 90 TABLET | Refills: 1 | Status: SHIPPED | OUTPATIENT
Start: 2023-08-01

## 2023-08-01 RX ORDER — VENLAFAXINE HYDROCHLORIDE 75 MG/1
CAPSULE, EXTENDED RELEASE ORAL
Qty: 90 CAPSULE | Refills: 1 | Status: SHIPPED | OUTPATIENT
Start: 2023-08-01

## 2023-08-10 ENCOUNTER — TELEPHONE (OUTPATIENT)
Age: 28
End: 2023-08-10

## 2023-08-10 NOTE — TELEPHONE ENCOUNTER
Pt need a TB skin test for a new job. You have not seen pt since 8/016/22. Are you able to place an order for her to complete a TB test or does pt need to come in for a OV?     Please advise    Thank you

## 2023-08-13 DIAGNOSIS — Z11.1 SCREENING FOR TUBERCULOSIS: Primary | ICD-10-CM

## 2023-08-17 ENCOUNTER — OFFICE VISIT (OUTPATIENT)
Age: 28
End: 2023-08-17

## 2023-08-17 VITALS
WEIGHT: 168 LBS | DIASTOLIC BLOOD PRESSURE: 79 MMHG | HEART RATE: 80 BPM | SYSTOLIC BLOOD PRESSURE: 113 MMHG | BODY MASS INDEX: 27.12 KG/M2 | OXYGEN SATURATION: 97 %

## 2023-08-17 DIAGNOSIS — Z11.1 ENCOUNTER FOR TUBERCULIN SKIN TEST: Primary | ICD-10-CM

## 2023-08-17 NOTE — PROGRESS NOTES
Chief Complaint   Patient presents with    Immunizations     Tb test     Vitals:    08/17/23 0804   BP: 113/79   Pulse: 80   SpO2: 97%

## 2023-08-18 ENCOUNTER — NURSE ONLY (OUTPATIENT)
Age: 28
End: 2023-08-18

## 2023-08-18 VITALS
HEART RATE: 82 BPM | SYSTOLIC BLOOD PRESSURE: 110 MMHG | HEIGHT: 66 IN | TEMPERATURE: 98.2 F | WEIGHT: 168 LBS | RESPIRATION RATE: 18 BRPM | OXYGEN SATURATION: 98 % | BODY MASS INDEX: 27 KG/M2 | DIASTOLIC BLOOD PRESSURE: 80 MMHG

## 2023-08-18 DIAGNOSIS — Z11.1 SCREENING FOR TUBERCULOSIS: Primary | ICD-10-CM

## 2023-08-18 ASSESSMENT — PATIENT HEALTH QUESTIONNAIRE - PHQ9
SUM OF ALL RESPONSES TO PHQ QUESTIONS 1-9: 0
2. FEELING DOWN, DEPRESSED OR HOPELESS: 0
SUM OF ALL RESPONSES TO PHQ QUESTIONS 1-9: 0
SUM OF ALL RESPONSES TO PHQ9 QUESTIONS 1 & 2: 0
1. LITTLE INTEREST OR PLEASURE IN DOING THINGS: 0

## 2023-08-18 NOTE — PROGRESS NOTES
Tuberculin skin test applied to left ventral forearm. PPD Placement note  Alec Nino, 34 y.o. female is here today for placement of PPD test  Reason for PPD test: work  Pt taken PPD test before: yes  Verified in allergy area and with patient that they are not allergic to the products PPD is made of (Phenol or Tween). Yes  Is patient taking any oral or IV steroid medication now or have they taken it in the last month? no  Has the patient ever received the BCG vaccine?: no  Has the patient been in recent contact with anyone known or suspected of having active TB disease?: no       Date of exposure (if applicable): NA       Name of person they were exposed to (if applicable): NA  Patient's Country of origin?: [place]  O: Alert and oriented in NAD. P:  PPD placed on 8/18/2023. Patient advised to return for reading within 48-72 hours.

## 2023-08-21 LAB
MM INDURATION, POC: 0 MM (ref 0–5)
PPD, POC: NEGATIVE

## 2023-09-07 ENCOUNTER — TELEPHONE (OUTPATIENT)
Age: 28
End: 2023-09-07

## 2023-09-07 NOTE — TELEPHONE ENCOUNTER
Zach GARRISON initiated through Cover my meds key # -  [id number] - PA Case ID: [id number]    Approvedtoday  CaseId:[id number];Status:Approved; Review Type:Prior Auth; Coverage Start Date:08/08/2023; Coverage End Date:09/06/2024

## 2023-09-07 NOTE — TELEPHONE ENCOUNTER
Patient would like to know if she can  samples of Ubrevely until her PA is complete.     Please contact

## 2023-09-07 NOTE — TELEPHONE ENCOUNTER
Patient is needing a PA on medication Lang Genera) and (Celecoxib 120mg/4.8mL). Please contact with any questions.

## 2023-10-06 ENCOUNTER — PATIENT MESSAGE (OUTPATIENT)
Age: 28
End: 2023-10-06

## 2023-10-06 DIAGNOSIS — G43.711 CHRONIC MIGRAINE WITHOUT AURA, INTRACTABLE, WITH STATUS MIGRAINOSUS: Primary | ICD-10-CM

## 2023-10-09 RX ORDER — UBROGEPANT 100 MG/1
TABLET ORAL
Qty: 16 TABLET | Refills: 5 | OUTPATIENT
Start: 2023-10-09

## 2023-10-27 ENCOUNTER — TELEPHONE (OUTPATIENT)
Age: 28
End: 2023-10-27

## 2023-10-27 NOTE — TELEPHONE ENCOUNTER
----- Message from Krystal Jaime sent at 10/25/2023  9:51 AM EDT -----  Subject: Appointment Request    Reason for Call: Established Patient Appointment needed: Routine Existing   Condition Follow Up    QUESTIONS    Reason for appointment request? Requested Provider unavailable - Maryam Cobb MD     Additional Information for Provider? pt. would like to schedule an appt   for her continuing neck pain. This pain has not gotten any worse but not   any better either.  She would like to also get another neck injection or   the pain.   ---------------------------------------------------------------------------  --------------  Briana POWERS  [phone]; OK to leave message on voicemail  ---------------------------------------------------------------------------  --------------  SCRIPT ANSWERS

## 2023-11-06 ENCOUNTER — TELEPHONE (OUTPATIENT)
Age: 28
End: 2023-11-06

## 2023-11-06 DIAGNOSIS — G43.009 MIGRAINE WITHOUT AURA, NOT INTRACTABLE, WITHOUT STATUS MIGRAINOSUS: ICD-10-CM

## 2023-11-06 NOTE — TELEPHONE ENCOUNTER
90 day refill request      Medication: venlafaxine (EFFEXOR XR) 75 MG extended release capsule            Pharmacy: [address] [place] [address] Delivery  [address] [place] [address], [address] [place] [address]

## 2023-11-07 RX ORDER — VENLAFAXINE HYDROCHLORIDE 75 MG/1
75 CAPSULE, EXTENDED RELEASE ORAL DAILY
Qty: 90 CAPSULE | Refills: 0 | Status: SHIPPED | OUTPATIENT
Start: 2023-11-07

## 2023-11-16 ENCOUNTER — OFFICE VISIT (OUTPATIENT)
Age: 28
End: 2023-11-16
Payer: COMMERCIAL

## 2023-11-16 ENCOUNTER — TELEPHONE (OUTPATIENT)
Age: 28
End: 2023-11-16

## 2023-11-16 VITALS
HEART RATE: 68 BPM | DIASTOLIC BLOOD PRESSURE: 76 MMHG | SYSTOLIC BLOOD PRESSURE: 112 MMHG | RESPIRATION RATE: 20 BRPM | OXYGEN SATURATION: 98 %

## 2023-11-16 DIAGNOSIS — F41.9 ANXIETY: ICD-10-CM

## 2023-11-16 DIAGNOSIS — M54.81 BILATERAL OCCIPITAL NEURALGIA: ICD-10-CM

## 2023-11-16 DIAGNOSIS — G43.711 CHRONIC MIGRAINE WITHOUT AURA, INTRACTABLE, WITH STATUS MIGRAINOSUS: Primary | ICD-10-CM

## 2023-11-16 PROCEDURE — 99214 OFFICE O/P EST MOD 30 MIN: CPT

## 2023-11-16 RX ORDER — BUSPIRONE HYDROCHLORIDE 5 MG/1
5 TABLET ORAL 2 TIMES DAILY
Qty: 60 TABLET | Refills: 0 | Status: SHIPPED | OUTPATIENT
Start: 2023-11-16 | End: 2023-11-17

## 2023-11-16 RX ORDER — RIMEGEPANT SULFATE 75 MG/75MG
75 TABLET, ORALLY DISINTEGRATING ORAL EVERY OTHER DAY
Qty: 16 TABLET | Refills: 5 | Status: SHIPPED | OUTPATIENT
Start: 2023-11-16 | End: 2023-11-17

## 2023-11-16 ASSESSMENT — ENCOUNTER SYMPTOMS
PHOTOPHOBIA: 1
NAUSEA: 1

## 2023-11-16 NOTE — PROGRESS NOTES
Was supposed to take it October 21st and for some reason was having an issue with getting it  She wasn't really seeing much of a difference anyway after taking it for about a year if not longer   Has been pretty much the same even after missing the dose in October   Often- atleast 4 migraines a week, rarely less   Lasting maybe 2 hrs if she can catch them but if she can't catch it it will last 24 hrs nothing seems help   Sometimes has to use the ubrelvy as well as excedrin  She doesn't have that many of them   But when she does they are dosing multiple meds
will have the patient try Nurtec ODT 75 mg  Every other day as migraine prevention therapy. Discussed potential side effects. We will do a PA. Gave her a co-pay card. She will continue to use Ubrelvy 100 mg for migraine rescue therapy at this time    Will place an order for the patient to have bilateral occipital nerve blocks done for neck pain that she believes causes her migraines. She may follow-up in 3 months to see how she is doing after starting her new medications.

## 2023-11-16 NOTE — TELEPHONE ENCOUNTER
Pt needs a PA for Nurtec please   She also needs to have a PA for bilateral occipital nerve block please

## 2023-11-17 ENCOUNTER — OFFICE VISIT (OUTPATIENT)
Age: 28
End: 2023-11-17
Payer: COMMERCIAL

## 2023-11-17 ENCOUNTER — NURSE TRIAGE (OUTPATIENT)
Dept: OTHER | Facility: CLINIC | Age: 28
End: 2023-11-17

## 2023-11-17 VITALS
SYSTOLIC BLOOD PRESSURE: 99 MMHG | HEIGHT: 66 IN | TEMPERATURE: 98.3 F | HEART RATE: 78 BPM | RESPIRATION RATE: 18 BRPM | OXYGEN SATURATION: 97 % | DIASTOLIC BLOOD PRESSURE: 65 MMHG | WEIGHT: 168 LBS | BODY MASS INDEX: 27 KG/M2

## 2023-11-17 DIAGNOSIS — G43.019 MIGRAINE WITHOUT AURA, INTRACTABLE, WITHOUT STATUS MIGRAINOSUS: Primary | ICD-10-CM

## 2023-11-17 PROCEDURE — 99213 OFFICE O/P EST LOW 20 MIN: CPT

## 2023-11-17 RX ORDER — PROCHLORPERAZINE 25 MG
25 SUPPOSITORY, RECTAL RECTAL EVERY 12 HOURS PRN
Qty: 5 SUPPOSITORY | Refills: 0 | Status: SHIPPED | OUTPATIENT
Start: 2023-11-17

## 2023-11-17 ASSESSMENT — ENCOUNTER SYMPTOMS
PHOTOPHOBIA: 1
NAUSEA: 1
VOMITING: 0

## 2023-11-17 NOTE — TELEPHONE ENCOUNTER
[facility]    [facility] from pt    Subjective: Caller states \"I get these all the time but Wednesday around lunch time it started. It was dull and manageable but today I can't see straight. I feel like a ton of pressure in my head\"     Current Symptoms: headache, spinning  Saw neurologist yesterday and called them as well- but they could not get her in. Not relieving - called out from work x3days  Onset: 2 days ago; gradual    Associated Symptoms: reduced activity    Pain Severity: 10/10; aching; waxing and waning    Temperature: no fevers     What has been tried: ibuprofen. Rx meds    LMP:  on currently  Pregnant: No    Recommended disposition: Go to ED/UCC Now (Or to Office with PCP Approval)    Care advice provided, patient verbalizes understanding; denies any other questions or concerns; instructed to call back for any new or worsening symptoms. Writer provided warm transfer to [address] [place] [address] at [facility] for Second level triage    Attention Provider: Thank you for allowing me to participate in the care of your patient. The patient was connected to triage in response to information provided to the ECC/PSC. Please do not respond through this encounter as the response is not directed to a shared pool.         Reason for Disposition   SEVERE headache, states 'worst headache' of life    Protocols used: Headache-ADULT-OH

## 2023-11-17 NOTE — PROGRESS NOTES
Pema Duffy is a 34 y.o. female who presents for Migraine (Started Wednesday, was dull but worse this morning with dizziness & light sensitivity)      HPI  - Patient is here with complaint of acute migraine. She follows with Dr. Niall Ge. - She had a migraine on Tuesday that subsided that same day. She took Ubrogepant and excedrin which helped it go away. - She woke up with a migraine on Wednesday that was not severe. - She woke up this morning with a severe migraine that was associated with nausea, light sensitivity and feeling dizzy. The headache became less severe after taking her Ubrogepant, but it is still present behind her eyes and she can tell is not fully aborted. She is no longer dizzy but has some residual nausea and light sensitivity. - She would like a medication to fully abort this migraine as it has been occurring for so many days together. Review of Systems   Review of Systems   HENT:  Negative for tinnitus. Eyes:  Positive for photophobia. Negative for visual disturbance. Gastrointestinal:  Positive for nausea. Negative for vomiting. Neurological:  Positive for headaches. Negative for dizziness.      Medical History  Past Medical History:   Diagnosis Date    Endometriosis     Migraine     Migraines     Routine Papanicolaou smear 03/31/2023    NILM    Vaginismus      Medications  Current Outpatient Medications   Medication Sig    prochlorperazine (COMPRO) 25 MG suppository Place 1 suppository rectally every 12 hours as needed for Nausea    venlafaxine (EFFEXOR XR) 75 MG extended release capsule Take 1 capsule by mouth daily    Ubrogepant 100 MG TABS 1 at migraine onset and repeat in 2 hours x 1 prn    naproxen (NAPROSYN) 500 MG tablet Take 1 tablet by mouth 2 times daily (with meals) As needed     Current Facility-Administered Medications   Medication Dose Route Frequency    tuberculin injection 5 Units  5 Units IntraDERmal Once     Immunizations   Immunization

## 2023-11-17 NOTE — TELEPHONE ENCOUNTER
No Authorization needed for occ nerve block    Confirmed with [employer]    CPT Codes 96313 [id number] covered    Confirmation number [id number]    FYI to Nurse

## 2023-11-20 DIAGNOSIS — G43.009 MIGRAINE WITHOUT AURA, NOT INTRACTABLE, WITHOUT STATUS MIGRAINOSUS: ICD-10-CM

## 2024-01-03 DIAGNOSIS — F41.9 ANXIETY: ICD-10-CM

## 2024-01-04 NOTE — TELEPHONE ENCOUNTER
Pharmacy requesting refill of venlafaxine ER caps 75mg. Pharmacy correct on file. Please approve or deny refill request.

## 2024-01-05 DIAGNOSIS — F41.9 ANXIETY: ICD-10-CM

## 2024-01-05 RX ORDER — VENLAFAXINE HYDROCHLORIDE 75 MG/1
75 CAPSULE, EXTENDED RELEASE ORAL DAILY
Qty: 90 CAPSULE | Refills: 0 | Status: SHIPPED | OUTPATIENT
Start: 2024-01-05

## 2024-01-05 RX ORDER — BUSPIRONE HYDROCHLORIDE 5 MG/1
5 TABLET ORAL 2 TIMES DAILY
Qty: 60 TABLET | Refills: 0 | Status: SHIPPED | OUTPATIENT
Start: 2024-01-05

## 2024-01-10 DIAGNOSIS — F41.9 ANXIETY: ICD-10-CM

## 2024-01-10 RX ORDER — BUSPIRONE HYDROCHLORIDE 5 MG/1
5 TABLET ORAL 2 TIMES DAILY
Qty: 180 TABLET | Refills: 0 | Status: SHIPPED | OUTPATIENT
Start: 2024-01-10

## 2024-01-16 ENCOUNTER — TELEPHONE (OUTPATIENT)
Age: 29
End: 2024-01-16

## 2024-01-16 NOTE — TELEPHONE ENCOUNTER
Patient would like a call regarding her medications Nurtec and Ubrevely. Irina stated she was told by her pharmacy she should not be taking both and to talk with her doctor.

## 2024-01-22 NOTE — TELEPHONE ENCOUNTER
Patient calling again to discuss medications Ubrelvy and Nurtec.    She stated the pharmacy told her she shouldn't be taking both at the same time.

## 2024-01-25 ENCOUNTER — OFFICE VISIT (OUTPATIENT)
Age: 29
End: 2024-01-25
Payer: COMMERCIAL

## 2024-01-25 VITALS
HEART RATE: 90 BPM | BODY MASS INDEX: 27.16 KG/M2 | RESPIRATION RATE: 18 BRPM | HEIGHT: 66 IN | DIASTOLIC BLOOD PRESSURE: 65 MMHG | SYSTOLIC BLOOD PRESSURE: 90 MMHG | OXYGEN SATURATION: 96 % | WEIGHT: 169 LBS | TEMPERATURE: 97.9 F

## 2024-01-25 DIAGNOSIS — R05.1 ACUTE COUGH: ICD-10-CM

## 2024-01-25 DIAGNOSIS — R09.89 SYMPTOMS OF UPPER RESPIRATORY INFECTION (URI): Primary | ICD-10-CM

## 2024-01-25 DIAGNOSIS — F41.9 ANXIETY: ICD-10-CM

## 2024-01-25 DIAGNOSIS — R09.81 NASAL CONGESTION: ICD-10-CM

## 2024-01-25 PROCEDURE — 99213 OFFICE O/P EST LOW 20 MIN: CPT

## 2024-01-25 RX ORDER — BUSPIRONE HYDROCHLORIDE 5 MG/1
5 TABLET ORAL 2 TIMES DAILY
Qty: 60 TABLET | OUTPATIENT
Start: 2024-01-25

## 2024-01-25 RX ORDER — BUSPIRONE HYDROCHLORIDE 5 MG/1
5 TABLET ORAL 2 TIMES DAILY
Qty: 180 TABLET | Refills: 0 | Status: CANCELLED | OUTPATIENT
Start: 2024-01-25

## 2024-01-25 ASSESSMENT — PATIENT HEALTH QUESTIONNAIRE - PHQ9
SUM OF ALL RESPONSES TO PHQ QUESTIONS 1-9: 2
SUM OF ALL RESPONSES TO PHQ9 QUESTIONS 1 & 2: 2
SUM OF ALL RESPONSES TO PHQ QUESTIONS 1-9: 2
1. LITTLE INTEREST OR PLEASURE IN DOING THINGS: 1
SUM OF ALL RESPONSES TO PHQ QUESTIONS 1-9: 2
2. FEELING DOWN, DEPRESSED OR HOPELESS: 1
SUM OF ALL RESPONSES TO PHQ QUESTIONS 1-9: 2

## 2024-01-25 NOTE — PROGRESS NOTES
Patient has been identified by name and .    Chief Complaint   Patient presents with    Congestion     Pt reports for sinus pressure & congestion, little cough for last 5 days, no fever.       Vitals:    24 1449   BP: 90/65   Site: Left Upper Arm   Position: Sitting   Cuff Size: Medium Adult   Pulse: 90   Resp: 18   Temp: 97.9 °F (36.6 °C)   TempSrc: Oral   SpO2: 96%   Weight: 76.7 kg (169 lb)   Height: 1.676 m (5' 6\")        1. Have you been to the ER, urgent care clinic since your last visit?  Hospitalized since your last visit?No    2. Have you seen or consulted any other health care providers outside of the [facility] since your last visit?  Include any pap smears or colon screening. No

## 2024-01-25 NOTE — PROGRESS NOTES
[address] [place] [address]   Office [phone], Fax [phone]      Chief Complaint:     Chief Complaint   Patient presents with    Congestion     Pt reports for sinus pressure & congestion, little cough for last 5 days, no fever.       Subjective:   HPI:  Irina Mares is a 28 y.o. female that presents for the above complaint.     URI sxs:   - Last 5 days: congestion, sinus pressure, light headache  - intermittent cough   - No fevers  - Green snot out nose   - Dayquil did not help and niquil did help   -     Review of Systems   All other systems reviewed and are negative.    Health Maintenance:  Health Maintenance Due   Topic Date Due    Hepatitis B vaccine (1 of 3 - 3-dose series) Never done    Varicella vaccine (1 of 2 - 2-dose childhood series) Never done    HPV vaccine (1 - 2-dose series) Never done    HIV screen  Never done    Hepatitis C screen  Never done    DTaP/Tdap/Td vaccine (1 - Tdap) Never done    Flu vaccine (1) 08/01/2023    COVID-19 Vaccine (3 - 2023-24 season) 09/01/2023        Current Medications  Current medications include:   Current Outpatient Medications   Medication Sig    Rimegepant Sulfate (NURTEC PO) Take by mouth    busPIRone (BUSPAR) 5 MG tablet Take 1 tablet by mouth 2 times daily    venlafaxine (EFFEXOR XR) 75 MG extended release capsule Take 1 capsule by mouth daily    prochlorperazine (COMPRO) 25 MG suppository insert 1 suppository rectally every 12 hours if needed for nausea    Ubrogepant 100 MG TABS 1 at migraine onset and repeat in 2 hours x 1 prn    naproxen (NAPROSYN) 500 MG tablet Take 1 tablet by mouth 2 times daily (with meals) As needed     Current Facility-Administered Medications   Medication Dose Route Frequency    tuberculin injection 5 Units  5 Units IntraDERmal Once       Allergies  No Known Allergies    Past Medical History  Past Medical History:   Diagnosis Date    Endometriosis     Migraine     Migraines     Routine Papanicolaou smear

## 2024-01-29 ENCOUNTER — TELEPHONE (OUTPATIENT)
Age: 29
End: 2024-01-29

## 2024-01-31 ENCOUNTER — TELEPHONE (OUTPATIENT)
Age: 29
End: 2024-01-31

## 2024-01-31 NOTE — TELEPHONE ENCOUNTER
Message given. Patient will call to schedule lab appointment    Need PA for Occipital nerve block- Thank you

## 2024-02-07 DIAGNOSIS — G43.009 MIGRAINE WITHOUT AURA, NOT INTRACTABLE, WITHOUT STATUS MIGRAINOSUS: ICD-10-CM

## 2024-02-07 DIAGNOSIS — G43.019 MIGRAINE WITHOUT AURA, INTRACTABLE, WITHOUT STATUS MIGRAINOSUS: ICD-10-CM

## 2024-02-07 RX ORDER — VENLAFAXINE HYDROCHLORIDE 75 MG/1
CAPSULE, EXTENDED RELEASE ORAL DAILY
Qty: 90 CAPSULE | Refills: 0 | Status: SHIPPED | OUTPATIENT
Start: 2024-02-07

## 2024-02-07 RX ORDER — PROPRANOLOL HYDROCHLORIDE 40 MG/1
TABLET ORAL
Qty: 90 TABLET | Refills: 1 | OUTPATIENT
Start: 2024-02-07

## 2024-02-19 ENCOUNTER — TELEPHONE (OUTPATIENT)
Age: 29
End: 2024-02-19

## 2024-02-19 NOTE — TELEPHONE ENCOUNTER
----- Message from Celena Vidales sent at 2/19/2024  1:13 PM EST -----  Subject: Message to Provider    QUESTIONS  Information for Provider? Dana is calling from [facility] and   wanted to let us know that the patient is in an eating disorder care   program with them. She stated her admission date was 02/08, and she does   go there everyday during the week. Please call if there is any other   questions. Thank you.   ---------------------------------------------------------------------------  --------------  CALL BACK INFO  [phone]; OK to leave message on voicemail  ---------------------------------------------------------------------------  --------------  SCRIPT ANSWERS  Relationship to Patient? Covered Entity  Covered Entity Type? Home Health Care?  Representative Name? Dana

## 2024-02-20 NOTE — PROGRESS NOTES
Irina Mares is a 28 y.o. female presents for a problem visit.    Chief Complaint   Patient presents with    Vaginal Discharge     Patient's last menstrual period was 02/03/2024 (exact date).      The patient is reporting having: vaginal discharge, fishy odor and itching      Examination chaperoned by Lilian dAams MA.

## 2024-02-21 ENCOUNTER — TELEPHONE (OUTPATIENT)
Age: 29
End: 2024-02-21

## 2024-02-21 NOTE — TELEPHONE ENCOUNTER
Called patient LM appt for ONB has been scheduled for 3/8/2024 at 10:10am and arrive by 10am. If unable to make that appt please call back to reschedule.

## 2024-02-22 ENCOUNTER — OFFICE VISIT (OUTPATIENT)
Age: 29
End: 2024-02-22
Payer: COMMERCIAL

## 2024-02-22 VITALS — DIASTOLIC BLOOD PRESSURE: 75 MMHG | SYSTOLIC BLOOD PRESSURE: 109 MMHG | BODY MASS INDEX: 27.12 KG/M2 | WEIGHT: 168 LBS

## 2024-02-22 DIAGNOSIS — N89.8 VAGINAL DISCHARGE: Primary | ICD-10-CM

## 2024-02-22 PROCEDURE — 99213 OFFICE O/P EST LOW 20 MIN: CPT | Performed by: OBSTETRICS & GYNECOLOGY

## 2024-02-22 NOTE — PROGRESS NOTES
OB/GYN Problem VIsit    HPI  Irina Mares is a No obstetric history on file.,  28 y.o. female who presents for a problem visit. Complains of thick white vaginal discharge. Has burning also, Strong odor, not fishy. No new partners - no concern for STD. Used Monistat 3 with no relief, finished 3 days ago.         Past Medical History:   Diagnosis Date    Endometriosis     Migraine     Migraines     Routine Papanicolaou smear 03/31/2023    NILM    Vaginismus      Past Surgical History:   Procedure Laterality Date    SEPTOPLASTY  2015     Social History     Occupational History    Not on file   Tobacco Use    Smoking status: Never    Smokeless tobacco: Never   Substance and Sexual Activity    Alcohol use: Yes     Alcohol/week: 3.0 standard drinks of alcohol     Comment: socially    Drug use: Yes     Frequency: 1.0 times per week     Types: Marijuana (Weed)    Sexual activity: Not on file     Family History   Problem Relation Age of Onset    Asthma Mother     Headache Brother     Thyroid Disease Father     Diabetes Father     Migraines Father     Headache Father     Migraines Brother     Asthma Brother     Depression Brother     Heart Attack Maternal Grandmother     Migraines Brother     Mental Retardation Maternal Aunt     Other Maternal Grandmother         Diverticulitis    Asthma Maternal Grandmother     Heart Disease Maternal Grandmother     Cancer Maternal Grandfather         lung (smoker)    Alcohol Abuse Maternal Grandfather     Liver Disease Maternal Grandfather     Stroke Paternal Grandmother     Seizures Paternal Grandmother     Heart Failure Paternal Grandfather     Abdominal aortic aneurysm Paternal Grandfather     Heart Disease Paternal Grandfather     Cancer Paternal Grandfather     Headache Brother     Migraines Brother     Mental Retardation Brother        No Known Allergies  Prior to Admission medications    Medication Sig Start Date End Date Taking? Authorizing Provider   venlafaxine (EFFEXOR XR)

## 2024-02-26 LAB
A VAGINAE DNA VAG QL NAA+PROBE: NORMAL SCORE
BVAB2 DNA VAG QL NAA+PROBE: NORMAL SCORE
C ALBICANS DNA VAG QL NAA+PROBE: NEGATIVE
C GLABRATA DNA VAG QL NAA+PROBE: NEGATIVE
MEGA1 DNA VAG QL NAA+PROBE: NORMAL SCORE
T VAGINALIS DNA VAG QL NAA+PROBE: NEGATIVE

## 2024-02-28 RX ORDER — BUSPIRONE HYDROCHLORIDE 5 MG/1
5 TABLET ORAL 2 TIMES DAILY
Qty: 60 TABLET | Refills: 0 | OUTPATIENT
Start: 2024-02-28

## 2024-04-04 DIAGNOSIS — G43.711 CHRONIC MIGRAINE WITHOUT AURA, INTRACTABLE, WITH STATUS MIGRAINOSUS: Primary | ICD-10-CM

## 2024-04-04 RX ORDER — BUTALBITAL, ACETAMINOPHEN AND CAFFEINE 50; 325; 40 MG/1; MG/1; MG/1
1 TABLET ORAL EVERY 4 HOURS PRN
Qty: 30 TABLET | Refills: 3 | Status: SHIPPED | OUTPATIENT
Start: 2024-04-04

## 2024-04-23 ENCOUNTER — OFFICE VISIT (OUTPATIENT)
Age: 29
End: 2024-04-23
Payer: COMMERCIAL

## 2024-04-23 ENCOUNTER — TELEPHONE (OUTPATIENT)
Age: 29
End: 2024-04-23

## 2024-04-23 VITALS — DIASTOLIC BLOOD PRESSURE: 82 MMHG | SYSTOLIC BLOOD PRESSURE: 118 MMHG | RESPIRATION RATE: 20 BRPM

## 2024-04-23 DIAGNOSIS — G43.711 CHRONIC MIGRAINE WITHOUT AURA, INTRACTABLE, WITH STATUS MIGRAINOSUS: Primary | ICD-10-CM

## 2024-04-23 PROCEDURE — 99214 OFFICE O/P EST MOD 30 MIN: CPT

## 2024-04-23 RX ORDER — ERENUMAB-AOOE 140 MG/ML
140 INJECTION, SOLUTION SUBCUTANEOUS
Qty: 1 ML | Refills: 5 | Status: SHIPPED | OUTPATIENT
Start: 2024-04-23

## 2024-04-23 RX ORDER — HYDROXYZINE HYDROCHLORIDE 10 MG/1
10 TABLET, FILM COATED ORAL AS NEEDED
COMMUNITY
Start: 2024-03-28

## 2024-04-23 ASSESSMENT — ENCOUNTER SYMPTOMS
NAUSEA: 1
PHOTOPHOBIA: 1
VOMITING: 1

## 2024-04-23 NOTE — TELEPHONE ENCOUNTER
Patient called wanting to know if we would be able to give her the injections she gets for her migraines. They were prescribed by her neurologist, but being that we are the closer facility to her she wanted to know if she can schedule a nurse visit once a month to get them done and not have herself or her  do them. If she can or can't patient would like to be contacted.    Thanks!

## 2024-04-23 NOTE — PROGRESS NOTES
March 29th- 23 migraines specific onset   4 to 5 days where she had a migraine that started that would not go away     Ubrelvy has not really been working, it works at the beginning   Nurtec for preventative   Jaya has worked for the most part- does work faster than the Ubrelvy       
mg by mouth nightly    Erenumab-aooe (AIMOVIG) 140 MG/ML SOAJ     Sig: Inject 140 mg into the skin every 30 days     Dispense:  1 mL     Refill:  5        1. Chronic migraine without aura, intractable, with status migrainosus    Will have the patient go ahead and stop taking Nurtec every other day for migraine prevention due to ineffectiveness.  Will start the patient on Aimovig 140 mg monthly for migraine prevention.  Discussed potential side effects including constipation.  Gave the patient 2 of the 70 mg samples to start.  We will do a PA for the medication.  Continue using Fioricet -40 mg and Ubrelvy 100 mg for rescue therapy as needed  Will look into the patient getting occipital nerve blocks again.  Patient will return in 3 months to see how she is doing on the Aimovig.    Return in about 3 months (around 7/23/2024).

## 2024-04-23 NOTE — TELEPHONE ENCOUNTER
Pt saw Celeste today and was told to schedule a nerve block injection appt with Rodney. Please reach out to the pt to schedule.

## 2024-04-30 ENCOUNTER — TELEPHONE (OUTPATIENT)
Age: 29
End: 2024-04-30

## 2024-04-30 NOTE — TELEPHONE ENCOUNTER
RE:Aimovig 140 mg injector prior authorization request sent to [employer] through [employer]    Key: [id number]     PA Case ID: [id number]    Status is approved     Coverage Start Date:03/31/2024     Coverage End Date:04/30/2025     FYI to nurse

## 2024-05-08 ENCOUNTER — TELEPHONE (OUTPATIENT)
Age: 29
End: 2024-05-08

## 2024-05-08 NOTE — TELEPHONE ENCOUNTER
RE: ONB    Patient has met deductible & out of pocket.  This procedure is covered per REILLY Song with [employer].  Ref # [id number].    Fyi to nurse.

## 2024-05-16 ENCOUNTER — PROCEDURE VISIT (OUTPATIENT)
Age: 29
End: 2024-05-16

## 2024-05-16 DIAGNOSIS — M54.81 OCCIPITAL NEURALGIA OF RIGHT SIDE: Primary | ICD-10-CM

## 2024-05-16 RX ORDER — METHYLPREDNISOLONE ACETATE 40 MG/ML
40 INJECTION, SUSPENSION INTRA-ARTICULAR; INTRALESIONAL; INTRAMUSCULAR; SOFT TISSUE ONCE
Status: COMPLETED | OUTPATIENT
Start: 2024-05-16 | End: 2024-05-16

## 2024-05-16 RX ORDER — BUPIVACAINE HYDROCHLORIDE 5 MG/ML
4 INJECTION, SOLUTION PERINEURAL ONCE
Status: COMPLETED | OUTPATIENT
Start: 2024-05-16 | End: 2024-05-16

## 2024-05-16 RX ADMIN — BUPIVACAINE HYDROCHLORIDE 20 MG: 5 INJECTION, SOLUTION PERINEURAL at 15:44

## 2024-05-16 RX ADMIN — METHYLPREDNISOLONE ACETATE 40 MG: 40 INJECTION, SUSPENSION INTRA-ARTICULAR; INTRALESIONAL; INTRAMUSCULAR; SOFT TISSUE at 15:44

## 2024-05-16 NOTE — PROGRESS NOTES
OFFICE PROCEDURE NOTE    PROCEDURE: Right Greater Occipital Nerve Block  Date of Procedure: 5/16/2024  CPT Code: 65456    ICD-10 Code:     ICD-10-CM    1. Occipital neuralgia of right side  M54.81 INJECT NERV BLCK,GREAT OCCIPTL     BUPivacaine (MARCAINE) 0.5 % injection 20 mg     methylPREDNISolone acetate (DEPO-MEDROL) injection 40 mg          Time Out performed immediately prior to the start of procedure:  IRodney APRN - NP, have performed the following review on Irina [facility] prior to the start of the procedure: Right Greater Occipital Nerve Block.  The patient was identified by name and date of birth.  Agreement on the procedure to be performed was verified.  The potential Benefits and potential Risks were explained to the patient.  The procedure site(s) were verified and marked as necessary.  Consent was signed and verified.  The patient was positioned for comfort.  Time: 1500.  Date of Procedure: 5/16/2024. Procedure performed by: BONNY Severino NP.  Provider assisted by: none.  Patient assisted by: self.  How patient tolerated procedure: mild procedure-related pain, no complications.  Comments: none.    Medications:   1 mL Depo-Medrol 40mg/ mL  2 mL Bupivacaine 0.5%    TECHNICAL:  A mixture of 1.0 mL Depo-medrol (40 mg) and 2.0 mL of Bupivacaine 0.5% was drawn up in sterile fashion in a 3 mL syringes (with 27-gauge needle attached).  The patient was placed in the seated position with neck slightly flexed.  A site approximately 1/3 the distance from the occipital protuberance to the right mastoid process was palpated, corresponding to the approximate location of the greater occipital nerve.  The skin was cleaned with alcohol wipes, and the needle was inserted perpendicularly until contacting occiput.  The needle was slightly withdrawn. After negative aspiration (no blood), 3.0 mL solution was injected in a fan like distrubution and the needle was withdrawn.  The patient appeared to

## 2024-06-10 ENCOUNTER — OFFICE VISIT (OUTPATIENT)
Age: 29
End: 2024-06-10
Payer: COMMERCIAL

## 2024-06-10 VITALS
HEART RATE: 98 BPM | BODY MASS INDEX: 28.83 KG/M2 | RESPIRATION RATE: 18 BRPM | DIASTOLIC BLOOD PRESSURE: 67 MMHG | OXYGEN SATURATION: 97 % | TEMPERATURE: 98.5 F | HEIGHT: 66 IN | SYSTOLIC BLOOD PRESSURE: 98 MMHG | WEIGHT: 179.4 LBS

## 2024-06-10 DIAGNOSIS — L50.9 URTICARIAL RASH: Primary | ICD-10-CM

## 2024-06-10 PROCEDURE — 99213 OFFICE O/P EST LOW 20 MIN: CPT

## 2024-06-10 RX ORDER — PREDNISONE 10 MG/1
TABLET ORAL
Qty: 14 TABLET | Refills: 0 | Status: SHIPPED | OUTPATIENT
Start: 2024-06-10 | End: 2024-06-22

## 2024-06-10 RX ORDER — FAMOTIDINE 20 MG/1
20 TABLET, FILM COATED ORAL 2 TIMES DAILY PRN
Qty: 30 TABLET | Refills: 1 | Status: SHIPPED | OUTPATIENT
Start: 2024-06-10

## 2024-06-10 RX ORDER — CETIRIZINE HYDROCHLORIDE 10 MG/1
10 TABLET ORAL DAILY
Qty: 60 TABLET | Refills: 1 | Status: SHIPPED | OUTPATIENT
Start: 2024-06-10

## 2024-06-10 NOTE — PROGRESS NOTES
I discussed the patient's history and physical exam with the resident. I reviewed the assessment and plan and agree with the resident's documentation.     
Pt here today with a rash throughout her body. Pt noticed the rash on her chest first and thought it was a sun rash because she was out int he sun but later that evening she noticed it on her legs, arms and stomach. The rash is itchy and painful. Pt is using cortisone crm and taking benadryl and allegra since yesterday evening.      Identified pt with two pt identifiers(name and ). Reviewed record in preparation for visit and have obtained necessary documentation.  No chief complaint on file.       Vitals:    06/10/24 0916   BP: 98/67   Site: Left Upper Arm   Position: Sitting   Cuff Size: Medium Adult   Pulse: 98   Resp: 18   Temp: 98.5 °F (36.9 °C)   TempSrc: Oral   SpO2: 97%   Weight: 81.4 kg (179 lb 6.4 oz)   Height: 1.676 m (5' 6\")         Coordination of Care Questionnaire:  :     \"Have you been to the ER, urgent care clinic since your last visit?  Hospitalized since your last visit?\"    NO  NO  “Have you seen or consulted any other health care providers outside of [facility] since your last visit?”    NO            Click Here for Release of Records Request   
MD  Resident [facility]

## 2024-11-19 DIAGNOSIS — G43.711 CHRONIC MIGRAINE WITHOUT AURA, INTRACTABLE, WITH STATUS MIGRAINOSUS: ICD-10-CM

## 2024-11-19 RX ORDER — BUTALBITAL, ACETAMINOPHEN AND CAFFEINE 50; 325; 40 MG/1; MG/1; MG/1
TABLET ORAL
Qty: 30 TABLET | Refills: 3 | Status: SHIPPED | OUTPATIENT
Start: 2024-11-19

## 2024-11-27 ENCOUNTER — OFFICE VISIT (OUTPATIENT)
Age: 29
End: 2024-11-27

## 2024-11-27 VITALS
SYSTOLIC BLOOD PRESSURE: 107 MMHG | HEART RATE: 96 BPM | RESPIRATION RATE: 18 BRPM | OXYGEN SATURATION: 95 % | BODY MASS INDEX: 27.97 KG/M2 | DIASTOLIC BLOOD PRESSURE: 75 MMHG | TEMPERATURE: 98.4 F | HEIGHT: 66 IN | WEIGHT: 174 LBS

## 2024-11-27 DIAGNOSIS — G43.711 CHRONIC MIGRAINE WITHOUT AURA, INTRACTABLE, WITH STATUS MIGRAINOSUS: ICD-10-CM

## 2024-11-27 DIAGNOSIS — J02.9 SORE THROAT: Primary | ICD-10-CM

## 2024-11-27 DIAGNOSIS — J02.0 ACUTE STREPTOCOCCAL PHARYNGITIS: ICD-10-CM

## 2024-11-27 LAB
GROUP A STREP ANTIGEN, POC: NEGATIVE
VALID INTERNAL CONTROL, POC: NORMAL

## 2024-11-27 SDOH — ECONOMIC STABILITY: INCOME INSECURITY: HOW HARD IS IT FOR YOU TO PAY FOR THE VERY BASICS LIKE FOOD, HOUSING, MEDICAL CARE, AND HEATING?: NOT VERY HARD

## 2024-11-27 SDOH — ECONOMIC STABILITY: FOOD INSECURITY: WITHIN THE PAST 12 MONTHS, THE FOOD YOU BOUGHT JUST DIDN'T LAST AND YOU DIDN'T HAVE MONEY TO GET MORE.: NEVER TRUE

## 2024-11-27 SDOH — ECONOMIC STABILITY: FOOD INSECURITY: WITHIN THE PAST 12 MONTHS, YOU WORRIED THAT YOUR FOOD WOULD RUN OUT BEFORE YOU GOT MONEY TO BUY MORE.: NEVER TRUE

## 2024-11-27 ASSESSMENT — PATIENT HEALTH QUESTIONNAIRE - PHQ9
SUM OF ALL RESPONSES TO PHQ QUESTIONS 1-9: 0
SUM OF ALL RESPONSES TO PHQ9 QUESTIONS 1 & 2: 0
SUM OF ALL RESPONSES TO PHQ QUESTIONS 1-9: 0
SUM OF ALL RESPONSES TO PHQ QUESTIONS 1-9: 0
1. LITTLE INTEREST OR PLEASURE IN DOING THINGS: NOT AT ALL
2. FEELING DOWN, DEPRESSED OR HOPELESS: NOT AT ALL
SUM OF ALL RESPONSES TO PHQ QUESTIONS 1-9: 0

## 2024-11-27 NOTE — PROGRESS NOTES
Chief Complaint   Patient presents with    Cold Symptoms     Patient is having these symptoms for the 3rd time in 6 weeks.  Symptoms: cough, nasal drainage, nasal congestion, sinus pressure, sinus headaches, losing voice.     Was taking Dayquil/Nyquil.   Mucinex, a little relief.   Sudafed.      Vitals:    11/27/24 0836   BP: 107/75   Site: Right Upper Arm   Position: Sitting   Cuff Size: Large Adult   Pulse: 96   Resp: 18   Temp: 98.4 °F (36.9 °C)   TempSrc: Temporal   SpO2: 95%   Weight: 78.9 kg (174 lb)   Height: 1.676 m (5' 6\")     \"Have you been to the ER, urgent care clinic since your last visit?  Hospitalized since your last visit?\"    NO    “Have you seen or consulted any other health care providers outside of [facility] since your last visit?”    NO            Click Here for Release of Records Request  
I reviewed with the resident the medical history and the resident's findings on the physical examination.  I discussed with the resident the patient's diagnosis and concur with the plan.     Keturah Yen MD 11/27/2024   
Pulse 96   Temp 98.4 °F (36.9 °C) (Temporal)   Resp 18   Ht 1.676 m (5' 6\")   Wt 78.9 kg (174 lb)   SpO2 95%   BMI 28.08 kg/m²   General: Alert and oriented, in no acute distress.  Responds to all questions appropriately.  SKIN: No rash.  Normal color.  HEAD: No sinus tenderness.  EYES: Conjunctiva are clear; pupils round and reactive to light.  EARS: External normal, canals clear, tympanic membranes normal.  NOSE: Edema, erythema, clear mucous drainage.             OROPHARYNX: Slight tonsil edema, erythema, no exudate.  NECK: Supple; no masses; normal lymphadenopathy.         LUNGS: Respirations unlabored; clear to auscultation bilaterally, no wheeze, rales or rhonchi.   CARDIOVASCULAR: Regular, rate, and rhythm without murmurs, gallops or rubs.  EXTREMITIES: No edema, cyanosis or clubbing.  NEUROLOGIC: Speech intact; face symmetrical; moves all extremities equally    Assessment:   Diagnosis Orders   1. Sore throat  AMB POC RAPID STREP A    Culture, Throat      2. Acute streptococcal pharyngitis        3. Chronic migraine without aura, intractable, with status migrainosus  Ubrogepant 100 MG TABS        Obtaining throat swab, will send for culture considering strep negative. Significant postnasal drip. Supportive care for now.    Plan:  Discharge instructions:  1. Combination cough and cold medicine such as Mucinex DM  2. Salt water gargle.  3. Plenty of fluids.  4. Ibuprofen (Motrin, Advil):  200mg - take 1-4 tables three times as needed for pain and fever   5. Acetaminophen (Tylenol):  500mg 1-2 tablets every 6 hours as needed for pain and fever.  6. Throat lozenges such as Halls as needed.  7. Humidifier as needed.  8. Benzonatate 100 mg - take 1 tab every 8 hours as needed for cough  9. Benadryl 25 mg - take 1 tab at night as needed for cough    Follow Up:  Get re-examined if not improved in  5-7 days or if symptoms worsen.     If you get suddenly worse, go to the nearest hospital Emergency Room    Surjit

## 2024-11-30 LAB — BACTERIA SPEC RESP CULT: NORMAL

## 2024-12-06 ENCOUNTER — TELEPHONE (OUTPATIENT)
Age: 29
End: 2024-12-06

## 2024-12-06 NOTE — TELEPHONE ENCOUNTER
We have attempted to get a prior authorization for Ubrelvy . They are not approving medication wanting history of meds you have tried and failed  Please reach out to your neurologist for assistance and approval of this medication  Unfortunately we do not have this info to provide  Dr Koch advised because you see neuro to reach out to them    Thanks   Magui SHANNON LPN

## 2024-12-10 ENCOUNTER — OFFICE VISIT (OUTPATIENT)
Age: 29
End: 2024-12-10
Payer: COMMERCIAL

## 2024-12-10 ENCOUNTER — TELEPHONE (OUTPATIENT)
Age: 29
End: 2024-12-10

## 2024-12-10 VITALS
SYSTOLIC BLOOD PRESSURE: 124 MMHG | DIASTOLIC BLOOD PRESSURE: 80 MMHG | RESPIRATION RATE: 20 BRPM | OXYGEN SATURATION: 99 % | HEART RATE: 88 BPM

## 2024-12-10 DIAGNOSIS — M54.81 OCCIPITAL NEURALGIA OF RIGHT SIDE: ICD-10-CM

## 2024-12-10 DIAGNOSIS — G43.711 CHRONIC MIGRAINE WITHOUT AURA, INTRACTABLE, WITH STATUS MIGRAINOSUS: Primary | ICD-10-CM

## 2024-12-10 PROCEDURE — 99214 OFFICE O/P EST MOD 30 MIN: CPT

## 2024-12-10 RX ORDER — LURASIDONE HYDROCHLORIDE 80 MG/1
80 TABLET, FILM COATED ORAL
COMMUNITY
Start: 2024-11-15

## 2024-12-10 RX ORDER — UBROGEPANT 100 MG/1
100 TABLET ORAL PRN
Qty: 16 TABLET | Refills: 3 | Status: SHIPPED | OUTPATIENT
Start: 2024-12-10

## 2024-12-10 ASSESSMENT — ENCOUNTER SYMPTOMS
PHOTOPHOBIA: 1
NAUSEA: 1

## 2024-12-10 NOTE — PROGRESS NOTES
Had 1 nerve block since last visit- not a huge difference since she had it done     Did the injections until August but her insurance wouldn't cover it anymore, she used the copay card for 2 of those but then she was still going to have to pay 1300 a month for it    SO it worked well the first 3 weeks and then the last week   She thinks the Ajovy did better in that regard but she felt like her body was just used to it and wouldn't be opposed to going back to that one

## 2024-12-10 NOTE — PROGRESS NOTES
Irina Mares is a 29 y.o. female who presents with the following  Chief Complaint   Patient presents with    Follow-up     Migraines      Last office visit note  HPI  Patient is here today for migraine follow-up.  Since the patient was here last she has been taking Nurtec ODT every other day for migraine prevention and said that for the first couple of months she felt good with the Nurtec with less headaches however since the end of March, she has had a big increase in her migraines.  She says that from March 29 until today she has had 23 migraines most of which were severe.  She says that the Ubrelvy she had for rescue therapy was not helping very well at 1 point and we sent Fioricet in for rescue therapy.  She states that the Fioricet works better than the Ubrelvy but she still uses them both for rescue therapy if needed.  She has some nausea/vomiting, photophobia, photophobia, dizziness with these headaches and they are still occurring in the same place of the right side of her head with a sharp pain and a dull pain behind her eyes and at her temples.  At her last visit we had ordered occipital nerve blocks for occipital pain however, she had decided to cancel her appointment but at this time she thinks she would like to reschedule them.     Of note she has tried and failed Topamax (side effects), amitriptyline, propanolol, Ajovy, Nurtec ODT, and Sumatriptan, and Rizatriptan.    Will have the patient go ahead and stop taking Nurtec every other day for migraine prevention due to ineffectiveness.  Will start the patient on Aimovig 140 mg monthly for migraine prevention.  Discussed potential side effects including constipation.  Gave the patient 2 of the 70 mg samples to start.  We will do a PA for the medication.  Continue using Fioricet -40 mg and Ubrelvy 100 mg for rescue therapy as needed  Will look into the patient getting occipital nerve blocks again.  Patient will return in 3 months to see how she

## 2024-12-10 NOTE — TELEPHONE ENCOUNTER
Pt would like to get another occipital nerve block with Rodney, if it is possible to have it done by the end of the year before her insurance changes that would be great, if not then we will have to wait and get a PA with her new insurance after the first of the year

## 2024-12-12 NOTE — TELEPHONE ENCOUNTER
Ubrelvy prior authorization request sent to [employer] via fax    request number #[id number]    Status pending     Ubrelvy approved     12/16/24-12/16/25    Approval letter uploaded to media     FYI to nurse

## 2025-01-06 DIAGNOSIS — G43.711 CHRONIC MIGRAINE WITHOUT AURA, INTRACTABLE, WITH STATUS MIGRAINOSUS: Primary | ICD-10-CM

## 2025-01-06 NOTE — TELEPHONE ENCOUNTER
Good morning Dr. Diaz,      I was just messaging to remind you that my insurance changed and now I need a new prior authorization for Ajovy. I was unable to submit a refill request for it, but I know it is due on the 10th. Thank you for your help!  Brinda Mares   Also, I did add my new insurance plan to my profile. It is [employer]. Thanks!

## 2025-01-17 ENCOUNTER — TELEPHONE (OUTPATIENT)
Age: 30
End: 2025-01-17

## 2025-01-17 NOTE — TELEPHONE ENCOUNTER
RE:Ajovy 225 mg auto injector prior authorization request sent to [employer] via epic    Case ID: [id number]     PA Case: [id number]    Status approved     Coverage Starts on: 1/13/2025     Coverage Ends on: 4/13/2025     Approval details in the referral tab    Approval letter uploaded to media     FYI to nurse

## 2025-02-10 ENCOUNTER — OFFICE VISIT (OUTPATIENT)
Age: 30
End: 2025-02-10
Payer: COMMERCIAL

## 2025-02-10 VITALS
WEIGHT: 178.4 LBS | BODY MASS INDEX: 28.67 KG/M2 | TEMPERATURE: 98.1 F | SYSTOLIC BLOOD PRESSURE: 114 MMHG | RESPIRATION RATE: 15 BRPM | DIASTOLIC BLOOD PRESSURE: 79 MMHG | OXYGEN SATURATION: 98 % | HEIGHT: 66 IN | HEART RATE: 89 BPM

## 2025-02-10 DIAGNOSIS — H00.011 HORDEOLUM EXTERNUM OF RIGHT UPPER EYELID: Primary | ICD-10-CM

## 2025-02-10 PROCEDURE — 99212 OFFICE O/P EST SF 10 MIN: CPT

## 2025-02-10 RX ORDER — BUSPIRONE HYDROCHLORIDE 10 MG/1
10 TABLET ORAL 2 TIMES DAILY
COMMUNITY
Start: 2025-01-02

## 2025-02-10 RX ORDER — LISDEXAMFETAMINE DIMESYLATE 30 MG/1
30 CAPSULE ORAL EVERY MORNING
COMMUNITY
Start: 2025-01-08

## 2025-02-10 ASSESSMENT — PATIENT HEALTH QUESTIONNAIRE - PHQ9
SUM OF ALL RESPONSES TO PHQ QUESTIONS 1-9: 0
2. FEELING DOWN, DEPRESSED OR HOPELESS: NOT AT ALL
SUM OF ALL RESPONSES TO PHQ QUESTIONS 1-9: 0
SUM OF ALL RESPONSES TO PHQ9 QUESTIONS 1 & 2: 0
1. LITTLE INTEREST OR PLEASURE IN DOING THINGS: NOT AT ALL

## 2025-02-11 NOTE — PROGRESS NOTES
Irina Mares is a 29 y.o. female    Chief Complaint   Patient presents with    Eye Pain     Infection on eye lid       \"Have you been to the ER, urgent care clinic since your last visit?  Hospitalized since your last visit?\"    NO    “Have you seen or consulted any other health care providers outside of [facility] since your last visit?”    NO              There were no vitals filed for this visit.       No data to display              Health Maintenance Due   Topic Date Due    Varicella vaccine (1 of 2 - 13+ 2-dose series) Never done    HIV screen  Never done    Hepatitis C screen  Never done    Hepatitis B vaccine (1 of 3 - 19+ 3-dose series) Never done    DTaP/Tdap/Td vaccine (1 - Tdap) Never done    COVID-19 Vaccine (3 - 2024-25 season) 09/01/2024

## 2025-02-11 NOTE — PROGRESS NOTES
[id number] [id number]    Office [phone], Fax [phone]      Madi Mares is a 29 y.o. female who presents for   Chief Complaint   Patient presents with    Eye Pain     Infection on eye lid     # Eyelid swelling, redness  - 1 week ago woke up with inflamed eyelid, next day eyelid was swollen shut  - Has had styes before, and this feels like it  - Itchy  - No discharge  - No redness of eye  - Using stye drops, moist compresses, and improving sx  - Wears contacts    Review of Systems   Per HPI. All other systems reviewed and are negative.    Health Maintenance:  Health Maintenance Due   Topic Date Due    Varicella vaccine (1 of 2 - 13+ 2-dose series) Never done    HIV screen  Never done    Hepatitis C screen  Never done    Hepatitis B vaccine (1 of 3 - 19+ 3-dose series) Never done    DTaP/Tdap/Td vaccine (1 - Tdap) Never done    COVID-19 Vaccine (3 - 2024-25 season) 09/01/2024        Medical History  Past Medical History:   Diagnosis Date    Endometriosis     Migraine     Migraines     Routine Papanicolaou smear 03/31/2023    NILM    Vaginismus        Medications  Current Outpatient Medications   Medication Sig    busPIRone (BUSPAR) 10 MG tablet Take 1 tablet by mouth in the morning and at bedtime    lisdexamfetamine (VYVANSE) 30 MG capsule Take 1 capsule by mouth every morning. Max Daily Amount: 30 mg    Fremanezumab-vfrm 225 MG/1.5ML SOAJ Inject 225 mg into the skin every 30 days    lurasidone (LATUDA) 80 MG TABS tablet Take 1 tablet by mouth Daily with supper    Ubrogepant (UBRELVY) 100 MG TABS Take 100 mg by mouth as needed (Take 1 pill at migraine onset. May repeat dose x 1 after 2 hours if still needed.)    butalbital-acetaminophen-caffeine (FIORICET, ESGIC) -40 MG per tablet TAKE ONE TABLET BY MOUTH EVERY 4 HOURS AS NEEDED FOR HEADACHES    famotidine (PEPCID) 20 MG tablet Take 1 tablet by mouth 2 times daily as needed (rash) (Patient not

## 2025-04-18 ENCOUNTER — TELEPHONE (OUTPATIENT)
Age: 30
End: 2025-04-18

## 2025-04-18 ENCOUNTER — OFFICE VISIT (OUTPATIENT)
Age: 30
End: 2025-04-18
Payer: COMMERCIAL

## 2025-04-18 VITALS
DIASTOLIC BLOOD PRESSURE: 68 MMHG | OXYGEN SATURATION: 98 % | RESPIRATION RATE: 20 BRPM | SYSTOLIC BLOOD PRESSURE: 110 MMHG | HEART RATE: 77 BPM

## 2025-04-18 DIAGNOSIS — M54.81 BILATERAL OCCIPITAL NEURALGIA: ICD-10-CM

## 2025-04-18 DIAGNOSIS — G43.711 CHRONIC MIGRAINE WITHOUT AURA, INTRACTABLE, WITH STATUS MIGRAINOSUS: Primary | ICD-10-CM

## 2025-04-18 PROCEDURE — 99214 OFFICE O/P EST MOD 30 MIN: CPT

## 2025-04-18 RX ORDER — RIZATRIPTAN BENZOATE 10 MG/1
10 TABLET ORAL PRN
Qty: 9 TABLET | Refills: 5 | Status: SHIPPED | OUTPATIENT
Start: 2025-04-18

## 2025-04-18 NOTE — PROGRESS NOTES
Migraines- have been good   Managed 2-3 a week, may need a different abortive medication the ubrelvy does not seem to be getting rid of them   Usually will last until she can go to sleep not that often though  Not to many that last over night   Has been using excedrin but she has to take quite a bit of it to get them to go away    
again so we will send rizatriptan 10 mg as needed for rescue therapy.    Placed a new referral for bilateral occipital nerve blocks per the patient's request for occipital neuralgia.  We will obtain a PA and let the patient know this has been approved and schedule her appointment at that time.    Follow-up in August before school restarts.  Return in about 4 months (around 8/18/2025).

## 2025-05-29 ENCOUNTER — TELEPHONE (OUTPATIENT)
Age: 30
End: 2025-05-29

## 2025-05-29 NOTE — TELEPHONE ENCOUNTER
Pt called to schedule an acute appt for recurring cold sxs, runny nose, cough; however, there are not avail appts. Said she was told previously if these reocurrences continue to happen, she should be tested for Mono. She is requesting a returned phone call from a nurse for medical advice.     Pt's phone number was confirmed.

## 2025-05-30 NOTE — TELEPHONE ENCOUNTER
Caller: Irina  (self)   Message Date: 05/30/25   Message Time: 3:32   Call Back Number: [phone]     Message:   Returning call; requesting call back

## 2025-05-30 NOTE — TELEPHONE ENCOUNTER
When I came in back fall/winter; if keep getting cough congestion.    [facility] Mono[facility]    Future Appointments   Date Time Provider Department Center   6/25/2025  8:00 AM [facility]   8/1/2025  3:00 PM Celeste Diaz APRN - NP [id number] AMB

## 2025-06-03 NOTE — TELEPHONE ENCOUNTER
RE:ONB      Per Miladis      [id number]   Injection of anesthetic agent and/or steroid into other nerve or branch:  Pre-Authorization is not Required Other Surgical and Endoscopic Procedures         [id number]   Injection of anesthetic agent and/or steroid into upper neck and back of head nerve   Pre-Authorization is not Required     Nurse notified

## 2025-06-12 ENCOUNTER — PROCEDURE VISIT (OUTPATIENT)
Age: 30
End: 2025-06-12

## 2025-06-12 DIAGNOSIS — M54.81 OCCIPITAL NEURALGIA OF RIGHT SIDE: ICD-10-CM

## 2025-06-12 DIAGNOSIS — G43.711 CHRONIC MIGRAINE WITHOUT AURA, INTRACTABLE, WITH STATUS MIGRAINOSUS: Primary | ICD-10-CM

## 2025-06-13 RX ORDER — METHYLPREDNISOLONE SODIUM SUCCINATE 40 MG/ML
40 INJECTION INTRAMUSCULAR; INTRAVENOUS ONCE
Status: COMPLETED | OUTPATIENT
Start: 2025-06-13 | End: 2025-06-13

## 2025-06-13 RX ORDER — BUPIVACAINE HYDROCHLORIDE 5 MG/ML
2 INJECTION, SOLUTION PERINEURAL ONCE
Status: COMPLETED | OUTPATIENT
Start: 2025-06-13 | End: 2025-06-13

## 2025-06-13 RX ADMIN — METHYLPREDNISOLONE SODIUM SUCCINATE 40 MG: 40 INJECTION INTRAMUSCULAR; INTRAVENOUS at 13:50

## 2025-06-13 RX ADMIN — BUPIVACAINE HYDROCHLORIDE 10 MG: 5 INJECTION, SOLUTION PERINEURAL at 13:49

## 2025-06-13 NOTE — PROGRESS NOTES
OFFICE PROCEDURE NOTE    PROCEDURE: Right Greater Occipital Nerve Block  Date of Procedure: 6/12/2025  CPT Code: 12012    ICD-10 Code:     ICD-10-CM    1. Chronic migraine without aura, intractable, with status migrainosus  G43.711 [facility] - Referral for Botox      2. Occipital neuralgia of right side  M54.81           Time Out performed immediately prior to the start of procedure:  I, Rodney Greer DNP, have performed the following review on [facility] prior to the start of the procedure: Right Greater Occipital Nerve Block.  The patient was identified by name and date of birth.  Agreement on the procedure to be performed was verified.  The potential Benefits and potential Risks were explained to the patient.  The procedure site(s) were verified and marked as necessary.  Consent was signed and verified.  The patient was positioned for comfort.  Time: 1000.  Date of Procedure: 6/12/2025 Procedure performed by: Rodney Greer DNP.  Provider assisted by: none.  Patient assisted by: self.  How patient tolerated procedure: mild procedure-related pain, no complications.  Comments: none.    Medications:   1 mL Depo-Medrol 40mg/ mL  2 mL Bupivacaine 0.5%    TECHNICAL:  A mixture of 1.0 mL Depo-medrol (40 mg) and 2.0 mL of Bupivacaine 0.5% was drawn up in sterile fashion in a 3 mL syringes (with 27-gauge needle attached).  The patient was placed in the seated position with neck slightly flexed.  A site approximately 1/3 the distance from the occipital protuberance to the right mastoid process was palpated, corresponding to the approximate location of the greater occipital nerve.  The skin was cleaned with alcohol wipes, and the needle was inserted perpendicularly until contacting occiput.  The needle was slightly withdrawn. After negative aspiration (no blood), 3.0 mL solution was injected in a fan like distrubution and the needle was withdrawn.  The patient appeared to have tolerated the procedure well without any

## 2025-06-13 NOTE — PROGRESS NOTES
Irina Mares is a 29 y.o. female who presents with the following  No chief complaint on file.      HPI      FU migraines, right occipital neuralgia.     Nerve block successful     She continues to have significant migraines   She is currently on Ajovy and taking this monthly     Using Ubrelvy PRN   Fioricet, Maxalt PRN   Unilateral, sharp stabbing pains   Hypersensitive to light, sound.     Diagnosis of Chronic Migraine (ICD-10 Code): G43.711 - Chronic migraine without aura, intractable, with status migrainosus    Procedure Code: 58207 (please specify if different )    J Code and dose:   J Code Units    155       History of headache:     Duration of Illness (months):  80   Number of headache days per month 16   Number of headache hours per day: 8       Prophylactic Drug Class Prescribed: (? Antidepressant ? Antiepileptic/Anticonvulsant ? ACE Inhibitor/Angiotensin II Receptor Blocker ? Beta-blocker ? CGRP ? Calcium Channel Blocker)    Propranolol  Elavil  Topamax  Effexor     Abortives tried and failed include  Imitrex  Maxalt      Prophylactic Drug Class Prescribed Drug Name Dose Duration  Outcome (Effective, Intolerant, Failed, Sub-optimal, Contra-Indicated)   AED Topamax   fail   SSRI Elavil, Buspar, Effexor   Fail    BP Inderal    Fail   CGRP Ajovy, Nurtec   Fail      Acute/Abortive Drug Class Prescribed: (? NSAID ? Ergot alkaloid derivative ? Triptan ? Combination/Other ? CGRP)    Acute/Abortive Drug Class Prescribed Drug Name Dose Duration Outcome (Effective, Intolerant, Failed, Sub-optimal, Contra-Indicated)   Triptan Imitrex, Maxalt   Fail   CGRP Ubrelvy,    Using         No Known Allergies    Current Outpatient Medications   Medication Sig Dispense Refill    rizatriptan (MAXALT) 10 MG tablet Take 1 tablet by mouth as needed for Migraine May repeat in 2 hours if needed 9 tablet 5    busPIRone (BUSPAR) 10 MG tablet Take 1 tablet by mouth in the morning and at bedtime      lisdexamfetamine

## 2025-06-30 ENCOUNTER — OFFICE VISIT (OUTPATIENT)
Age: 30
End: 2025-06-30
Payer: COMMERCIAL

## 2025-06-30 VITALS
BODY MASS INDEX: 28.89 KG/M2 | HEART RATE: 75 BPM | WEIGHT: 179 LBS | OXYGEN SATURATION: 95 % | SYSTOLIC BLOOD PRESSURE: 101 MMHG | TEMPERATURE: 98.3 F | DIASTOLIC BLOOD PRESSURE: 65 MMHG

## 2025-06-30 DIAGNOSIS — Z11.59 NEED FOR HEPATITIS C SCREENING TEST: ICD-10-CM

## 2025-06-30 DIAGNOSIS — G43.009 MIGRAINE WITHOUT AURA AND WITHOUT STATUS MIGRAINOSUS, NOT INTRACTABLE: ICD-10-CM

## 2025-06-30 DIAGNOSIS — Z23 NEED FOR TDAP VACCINATION: Primary | ICD-10-CM

## 2025-06-30 DIAGNOSIS — F31.9 BIPOLAR 1 DISORDER (HCC): ICD-10-CM

## 2025-06-30 DIAGNOSIS — Z11.4 SCREENING FOR HIV (HUMAN IMMUNODEFICIENCY VIRUS): ICD-10-CM

## 2025-06-30 DIAGNOSIS — N92.6 IRREGULAR MENSTRUAL BLEEDING: ICD-10-CM

## 2025-06-30 PROCEDURE — 90715 TDAP VACCINE 7 YRS/> IM: CPT

## 2025-06-30 PROCEDURE — 99395 PREV VISIT EST AGE 18-39: CPT

## 2025-06-30 PROCEDURE — 90471 IMMUNIZATION ADMIN: CPT

## 2025-06-30 RX ORDER — MEDROXYPROGESTERONE ACETATE 150 MG/ML
150 INJECTION, SUSPENSION INTRAMUSCULAR
Qty: 1 ML | Refills: 3 | Status: SHIPPED | OUTPATIENT
Start: 2025-06-30

## 2025-06-30 RX ORDER — PROCHLORPERAZINE 25 MG
25 SUPPOSITORY, RECTAL RECTAL EVERY 12 HOURS PRN
Qty: 15 SUPPOSITORY | Refills: 0 | Status: SHIPPED | OUTPATIENT
Start: 2025-06-30

## 2025-06-30 SDOH — ECONOMIC STABILITY: FOOD INSECURITY: WITHIN THE PAST 12 MONTHS, YOU WORRIED THAT YOUR FOOD WOULD RUN OUT BEFORE YOU GOT MONEY TO BUY MORE.: NEVER TRUE

## 2025-06-30 SDOH — ECONOMIC STABILITY: FOOD INSECURITY: WITHIN THE PAST 12 MONTHS, THE FOOD YOU BOUGHT JUST DIDN'T LAST AND YOU DIDN'T HAVE MONEY TO GET MORE.: NEVER TRUE

## 2025-06-30 ASSESSMENT — PATIENT HEALTH QUESTIONNAIRE - PHQ9
2. FEELING DOWN, DEPRESSED OR HOPELESS: NOT AT ALL
SUM OF ALL RESPONSES TO PHQ QUESTIONS 1-9: 0
SUM OF ALL RESPONSES TO PHQ QUESTIONS 1-9: 0
1. LITTLE INTEREST OR PLEASURE IN DOING THINGS: NOT AT ALL
SUM OF ALL RESPONSES TO PHQ QUESTIONS 1-9: 0
SUM OF ALL RESPONSES TO PHQ QUESTIONS 1-9: 0

## 2025-06-30 NOTE — PROGRESS NOTES
Identified pt with two pt identifiers(name and ). Reviewed record in preparation for visit and have obtained necessary documentation.  Chief Complaint   Patient presents with    Annual Exam     Pt here for annual exam. Reports having a migraine x 36 hours. Has taken naproxen,         Vitals:    25 1558   BP: 101/65   BP Site: Left Upper Arm   Patient Position: Sitting   BP Cuff Size: Medium Adult   Pulse: 75   Temp: 98.3 °F (36.8 °C)   TempSrc: Oral   SpO2: 95%   Weight: 81.2 kg (179 lb)         Coordination of Care Questionnaire:  :     \"Have you been to the ER, urgent care clinic since your last visit?  Hospitalized since your last visit?\"    YES - When: approximately 2 months ago.  [facility] and Why[facility]    “Have you seen or consulted any other health care providers outside of [facility] since your last visit?”    YES - When: approximately 1 months ago.  Where and Why: [facility]= medication management..            Click Here for Release of Records Request

## 2025-06-30 NOTE — PROGRESS NOTES
[id number] [id number]    Office [phone], Fax [phone]    Subjective:     Irina Mares is a 30 y.o. female presenting for well woman exam.     Acute complaints:     #Sick symptoms  - respiratory sx  - reoccurring throughout school year  - feels like possible related to building such as mold/dirty conditions  - Also possible related to sick contacts at school    Chronic complaints:     #Migraine  - current migraine for last 36 hours.   - Takes Ajovy injections.   - Tried Maxalt yesterday, HA somewhat improved this morning. Already taken twice this week.  - Took fiorecet and tylenol which helped somewhat.   - Has FU in August to discuss further treatment.     #Bipolar  - on latuda  - follows psych Dr. Mackay @ [facility]. Medication managed by them    Social History:  Tobacco use: Denies current of former tobacco use.   Alcohol use: Denies current alcohol use, social.  Drug use: Marijuana pen, every day.  Exercise: running. Running half marathon.   Diet: history of ED, work with [employer].   Sleep: good.     Gyn:  Menses at age 14. Regular. G0.     Sexual: no hx of STDs.  currently sexually active with 1 partners. Tracking method and using condoms for BC. Not interested in children.Interested in depot shot.     Psych: HX of  bipolar, doing well.       Health Maintenance - reviewed:  Pap (age 21-65): Up to date.   HIV screening: today  Hep C testing: today  Chlamydia / Gonorrhea screening: declines today  Vaccinations: TDAP due    Allergies- reviewed:   No Known Allergies      Medications- reviewed:   Current Outpatient Medications   Medication Sig    prochlorperazine (COMPRO) 25 MG suppository Place 1 suppository rectally every 12 hours as needed for Nausea    medroxyPROGESTERone (DEPO-PROVERA) 150 MG/ML injection Inject 1 mL into the muscle every 3 months    rizatriptan (MAXALT) 10 MG tablet Take 1 tablet by mouth as needed for Migraine May repeat in 2

## 2025-08-01 ENCOUNTER — TELEPHONE (OUTPATIENT)
Age: 30
End: 2025-08-01

## 2025-08-08 DIAGNOSIS — G43.711 CHRONIC MIGRAINE WITHOUT AURA, INTRACTABLE, WITH STATUS MIGRAINOSUS: ICD-10-CM

## 2025-08-11 RX ORDER — FREMANEZUMAB-VFRM 225 MG/1.5ML
INJECTION SUBCUTANEOUS
Qty: 1.5 ML | Refills: 5 | Status: ACTIVE | OUTPATIENT
Start: 2025-08-11

## 2025-08-19 ENCOUNTER — TELEMEDICINE ON DEMAND (OUTPATIENT)
Age: 30
End: 2025-08-19
Payer: COMMERCIAL

## 2025-08-19 DIAGNOSIS — U07.1 COVID-19: Primary | ICD-10-CM

## 2025-08-19 PROCEDURE — 99213 OFFICE O/P EST LOW 20 MIN: CPT | Performed by: NURSE PRACTITIONER

## 2025-08-19 ASSESSMENT — ENCOUNTER SYMPTOMS
VOMITING: 0
COUGH: 1
SORE THROAT: 1
DIARRHEA: 0
SWOLLEN GLANDS: 1
RHINORRHEA: 1
WHEEZING: 0
SHORTNESS OF BREATH: 0
NAUSEA: 1

## 2025-08-21 ENCOUNTER — TELEPHONE (OUTPATIENT)
Age: 30
End: 2025-08-21